# Patient Record
Sex: FEMALE | Race: WHITE | NOT HISPANIC OR LATINO | Employment: UNEMPLOYED | URBAN - NONMETROPOLITAN AREA
[De-identification: names, ages, dates, MRNs, and addresses within clinical notes are randomized per-mention and may not be internally consistent; named-entity substitution may affect disease eponyms.]

---

## 2017-07-03 ENCOUNTER — TRANSCRIBE ORDERS (OUTPATIENT)
Dept: ADMINISTRATIVE | Facility: HOSPITAL | Age: 42
End: 2017-07-03

## 2017-07-03 DIAGNOSIS — R92.8 ABNORMAL MAMMOGRAM: Primary | ICD-10-CM

## 2017-07-12 ENCOUNTER — APPOINTMENT (OUTPATIENT)
Dept: ULTRASOUND IMAGING | Facility: HOSPITAL | Age: 42
End: 2017-07-12
Attending: FAMILY MEDICINE

## 2017-07-12 ENCOUNTER — APPOINTMENT (OUTPATIENT)
Dept: MAMMOGRAPHY | Facility: HOSPITAL | Age: 42
End: 2017-07-12
Attending: FAMILY MEDICINE

## 2018-09-04 ENCOUNTER — HOSPITAL ENCOUNTER (OUTPATIENT)
Facility: HOSPITAL | Age: 43
Discharge: HOME | End: 2018-09-05
Attending: EMERGENCY MEDICINE | Admitting: PHYSICIAN ASSISTANT

## 2018-09-04 ENCOUNTER — ANESTHESIA (OUTPATIENT)
Dept: PERIOP | Facility: HOSPITAL | Age: 43
End: 2018-09-04

## 2018-09-04 ENCOUNTER — ANESTHESIA EVENT (OUTPATIENT)
Dept: PERIOP | Facility: HOSPITAL | Age: 43
End: 2018-09-04

## 2018-09-04 ENCOUNTER — PREP FOR SURGERY (OUTPATIENT)
Dept: OTHER | Facility: HOSPITAL | Age: 43
End: 2018-09-04

## 2018-09-04 DIAGNOSIS — I10 ESSENTIAL HYPERTENSION: ICD-10-CM

## 2018-09-04 DIAGNOSIS — N76.4 VULVAR ABSCESS: ICD-10-CM

## 2018-09-04 DIAGNOSIS — E11.9 TYPE 2 DIABETES MELLITUS WITHOUT COMPLICATION, WITHOUT LONG-TERM CURRENT USE OF INSULIN (HCC): ICD-10-CM

## 2018-09-04 DIAGNOSIS — Z72.0 TOBACCO ABUSE: ICD-10-CM

## 2018-09-04 DIAGNOSIS — N76.4 ABSCESS OF VULVA: ICD-10-CM

## 2018-09-04 DIAGNOSIS — N76.4 ABSCESS, VULVA: Primary | ICD-10-CM

## 2018-09-04 LAB
ALBUMIN SERPL-MCNC: 4.1 G/DL (ref 3.5–5)
ALBUMIN/GLOB SERPL: 1.4 G/DL (ref 1.5–2.5)
ALP SERPL-CCNC: 74 U/L (ref 35–104)
ALT SERPL W P-5'-P-CCNC: 26 U/L (ref 10–36)
ANION GAP SERPL CALCULATED.3IONS-SCNC: 3.5 MMOL/L (ref 3.6–11.2)
AST SERPL-CCNC: 16 U/L (ref 10–30)
B-HCG UR QL: NEGATIVE
BACTERIA UR QL AUTO: ABNORMAL /HPF
BASOPHILS # BLD AUTO: 0.04 10*3/MM3 (ref 0–0.3)
BASOPHILS NFR BLD AUTO: 0.3 % (ref 0–2)
BILIRUB SERPL-MCNC: 0.4 MG/DL (ref 0.2–1.8)
BILIRUB UR QL STRIP: NEGATIVE
BUN BLD-MCNC: 12 MG/DL (ref 7–21)
BUN/CREAT SERPL: 13.5 (ref 7–25)
CALCIUM SPEC-SCNC: 8.9 MG/DL (ref 7.7–10)
CHLORIDE SERPL-SCNC: 109 MMOL/L (ref 99–112)
CLARITY UR: ABNORMAL
CO2 SERPL-SCNC: 22.5 MMOL/L (ref 24.3–31.9)
COLOR UR: YELLOW
CREAT BLD-MCNC: 0.89 MG/DL (ref 0.43–1.29)
D-LACTATE SERPL-SCNC: 1.4 MMOL/L (ref 0.5–2)
DEPRECATED RDW RBC AUTO: 40.1 FL (ref 37–54)
EOSINOPHIL # BLD AUTO: 0.11 10*3/MM3 (ref 0–0.7)
EOSINOPHIL NFR BLD AUTO: 0.7 % (ref 0–5)
ERYTHROCYTE [DISTWIDTH] IN BLOOD BY AUTOMATED COUNT: 12.6 % (ref 11.5–14.5)
GFR SERPL CREATININE-BSD FRML MDRD: 69 ML/MIN/1.73
GLOBULIN UR ELPH-MCNC: 3 GM/DL
GLUCOSE BLD-MCNC: 169 MG/DL (ref 70–110)
GLUCOSE BLDC GLUCOMTR-MCNC: 111 MG/DL (ref 70–130)
GLUCOSE BLDC GLUCOMTR-MCNC: 114 MG/DL (ref 70–130)
GLUCOSE BLDC GLUCOMTR-MCNC: 154 MG/DL (ref 70–130)
GLUCOSE BLDC GLUCOMTR-MCNC: 74 MG/DL (ref 70–130)
GLUCOSE UR STRIP-MCNC: NEGATIVE MG/DL
HCT VFR BLD AUTO: 41.2 % (ref 37–47)
HGB BLD-MCNC: 13.5 G/DL (ref 12–16)
HGB UR QL STRIP.AUTO: NEGATIVE
HYALINE CASTS UR QL AUTO: ABNORMAL /LPF
IMM GRANULOCYTES # BLD: 0.04 10*3/MM3 (ref 0–0.03)
IMM GRANULOCYTES NFR BLD: 0.3 % (ref 0–0.5)
KETONES UR QL STRIP: NEGATIVE
LEUKOCYTE ESTERASE UR QL STRIP.AUTO: ABNORMAL
LYMPHOCYTES # BLD AUTO: 2.19 10*3/MM3 (ref 1–3)
LYMPHOCYTES NFR BLD AUTO: 13.9 % (ref 21–51)
MCH RBC QN AUTO: 29.1 PG (ref 27–33)
MCHC RBC AUTO-ENTMCNC: 32.8 G/DL (ref 33–37)
MCV RBC AUTO: 88.8 FL (ref 80–94)
MONOCYTES # BLD AUTO: 0.92 10*3/MM3 (ref 0.1–0.9)
MONOCYTES NFR BLD AUTO: 5.9 % (ref 0–10)
NEUTROPHILS # BLD AUTO: 12.42 10*3/MM3 (ref 1.4–6.5)
NEUTROPHILS NFR BLD AUTO: 78.9 % (ref 30–70)
NITRITE UR QL STRIP: NEGATIVE
OSMOLALITY SERPL CALC.SUM OF ELEC: 273.8 MOSM/KG (ref 273–305)
PH UR STRIP.AUTO: 6 [PH] (ref 5–8)
PLATELET # BLD AUTO: 273 10*3/MM3 (ref 130–400)
PMV BLD AUTO: 11.3 FL (ref 6–10)
POTASSIUM BLD-SCNC: 3.9 MMOL/L (ref 3.5–5.3)
PROT SERPL-MCNC: 7.1 G/DL (ref 6–8)
PROT UR QL STRIP: NEGATIVE
RBC # BLD AUTO: 4.64 10*6/MM3 (ref 4.2–5.4)
RBC # UR: ABNORMAL /HPF
REF LAB TEST METHOD: ABNORMAL
SODIUM BLD-SCNC: 135 MMOL/L (ref 135–153)
SP GR UR STRIP: 1.01 (ref 1–1.03)
SQUAMOUS #/AREA URNS HPF: ABNORMAL /HPF
UROBILINOGEN UR QL STRIP: ABNORMAL
WBC NRBC COR # BLD: 15.72 10*3/MM3 (ref 4.5–12.5)
WBC UR QL AUTO: ABNORMAL /HPF

## 2018-09-04 PROCEDURE — 25010000002 MORPHINE SULFATE (PF) 2 MG/ML SOLUTION: Performed by: EMERGENCY MEDICINE

## 2018-09-04 PROCEDURE — 25010000002 FENTANYL CITRATE (PF) 100 MCG/2ML SOLUTION: Performed by: NURSE ANESTHETIST, CERTIFIED REGISTERED

## 2018-09-04 PROCEDURE — 25010000002 PROPOFOL 1000 MG/ML EMULSION: Performed by: NURSE ANESTHETIST, CERTIFIED REGISTERED

## 2018-09-04 PROCEDURE — 25010000002 HYDROMORPHONE PER 4 MG: Performed by: OBSTETRICS & GYNECOLOGY

## 2018-09-04 PROCEDURE — 25010000002 PROPOFOL 10 MG/ML EMULSION: Performed by: NURSE ANESTHETIST, CERTIFIED REGISTERED

## 2018-09-04 PROCEDURE — G0378 HOSPITAL OBSERVATION PER HR: HCPCS

## 2018-09-04 PROCEDURE — 83605 ASSAY OF LACTIC ACID: CPT | Performed by: PHYSICIAN ASSISTANT

## 2018-09-04 PROCEDURE — 25010000002 PIPERACILLIN-TAZOBACTAM: Performed by: PHYSICIAN ASSISTANT

## 2018-09-04 PROCEDURE — 25010000002 ONDANSETRON PER 1 MG: Performed by: EMERGENCY MEDICINE

## 2018-09-04 PROCEDURE — 96361 HYDRATE IV INFUSION ADD-ON: CPT

## 2018-09-04 PROCEDURE — 87040 BLOOD CULTURE FOR BACTERIA: CPT | Performed by: PHYSICIAN ASSISTANT

## 2018-09-04 PROCEDURE — 96365 THER/PROPH/DIAG IV INF INIT: CPT

## 2018-09-04 PROCEDURE — 25010000002 VANCOMYCIN PER 500 MG: Performed by: PHYSICIAN ASSISTANT

## 2018-09-04 PROCEDURE — 94640 AIRWAY INHALATION TREATMENT: CPT

## 2018-09-04 PROCEDURE — 96375 TX/PRO/DX INJ NEW DRUG ADDON: CPT

## 2018-09-04 PROCEDURE — 85025 COMPLETE CBC W/AUTO DIFF WBC: CPT | Performed by: PHYSICIAN ASSISTANT

## 2018-09-04 PROCEDURE — 99285 EMERGENCY DEPT VISIT HI MDM: CPT

## 2018-09-04 PROCEDURE — 25010000002 MIDAZOLAM PER 1 MG: Performed by: NURSE ANESTHETIST, CERTIFIED REGISTERED

## 2018-09-04 PROCEDURE — 81025 URINE PREGNANCY TEST: CPT | Performed by: PHYSICIAN ASSISTANT

## 2018-09-04 PROCEDURE — 94799 UNLISTED PULMONARY SVC/PX: CPT

## 2018-09-04 PROCEDURE — 94799 UNLISTED PULMONARY SVC/PX: CPT | Performed by: NURSE PRACTITIONER

## 2018-09-04 PROCEDURE — 82962 GLUCOSE BLOOD TEST: CPT

## 2018-09-04 PROCEDURE — 87070 CULTURE OTHR SPECIMN AEROBIC: CPT | Performed by: OBSTETRICS & GYNECOLOGY

## 2018-09-04 PROCEDURE — 80053 COMPREHEN METABOLIC PANEL: CPT | Performed by: PHYSICIAN ASSISTANT

## 2018-09-04 PROCEDURE — 87205 SMEAR GRAM STAIN: CPT | Performed by: OBSTETRICS & GYNECOLOGY

## 2018-09-04 PROCEDURE — 81001 URINALYSIS AUTO W/SCOPE: CPT | Performed by: PHYSICIAN ASSISTANT

## 2018-09-04 RX ORDER — SODIUM CHLORIDE 0.9 % (FLUSH) 0.9 %
1-10 SYRINGE (ML) INJECTION AS NEEDED
Status: DISCONTINUED | OUTPATIENT
Start: 2018-09-04 | End: 2018-09-04 | Stop reason: HOSPADM

## 2018-09-04 RX ORDER — PANTOPRAZOLE SODIUM 40 MG/1
40 TABLET, DELAYED RELEASE ORAL EVERY MORNING
Status: DISCONTINUED | OUTPATIENT
Start: 2018-09-05 | End: 2018-09-05 | Stop reason: HOSPADM

## 2018-09-04 RX ORDER — CETIRIZINE HYDROCHLORIDE 10 MG/1
10 TABLET ORAL DAILY
Status: CANCELLED | OUTPATIENT
Start: 2018-09-04

## 2018-09-04 RX ORDER — CETIRIZINE HYDROCHLORIDE 10 MG/1
10 TABLET ORAL DAILY
Status: DISCONTINUED | OUTPATIENT
Start: 2018-09-04 | End: 2018-09-05 | Stop reason: HOSPADM

## 2018-09-04 RX ORDER — PROPOFOL 10 MG/ML
VIAL (ML) INTRAVENOUS AS NEEDED
Status: DISCONTINUED | OUTPATIENT
Start: 2018-09-04 | End: 2018-09-04 | Stop reason: SURG

## 2018-09-04 RX ORDER — AMLODIPINE BESYLATE 5 MG/1
5 TABLET ORAL NIGHTLY
Status: DISCONTINUED | OUTPATIENT
Start: 2018-09-04 | End: 2018-09-05 | Stop reason: HOSPADM

## 2018-09-04 RX ORDER — HYDROCHLOROTHIAZIDE 25 MG/1
25 TABLET ORAL DAILY
Status: DISCONTINUED | OUTPATIENT
Start: 2018-09-04 | End: 2018-09-05 | Stop reason: HOSPADM

## 2018-09-04 RX ORDER — DEXTROSE MONOHYDRATE 25 G/50ML
25 INJECTION, SOLUTION INTRAVENOUS
Status: DISCONTINUED | OUTPATIENT
Start: 2018-09-04 | End: 2018-09-05 | Stop reason: HOSPADM

## 2018-09-04 RX ORDER — NICOTINE POLACRILEX 4 MG
15 LOZENGE BUCCAL
Status: DISCONTINUED | OUTPATIENT
Start: 2018-09-04 | End: 2018-09-05 | Stop reason: HOSPADM

## 2018-09-04 RX ORDER — DOCUSATE SODIUM 100 MG/1
100 CAPSULE, LIQUID FILLED ORAL 2 TIMES DAILY
Status: DISCONTINUED | OUTPATIENT
Start: 2018-09-04 | End: 2018-09-05 | Stop reason: HOSPADM

## 2018-09-04 RX ORDER — IPRATROPIUM BROMIDE AND ALBUTEROL SULFATE 2.5; .5 MG/3ML; MG/3ML
3 SOLUTION RESPIRATORY (INHALATION) ONCE AS NEEDED
Status: COMPLETED | OUTPATIENT
Start: 2018-09-04 | End: 2018-09-04

## 2018-09-04 RX ORDER — LOSARTAN POTASSIUM 50 MG/1
100 TABLET ORAL DAILY
Status: DISCONTINUED | OUTPATIENT
Start: 2018-09-04 | End: 2018-09-05 | Stop reason: HOSPADM

## 2018-09-04 RX ORDER — GLIPIZIDE 5 MG/1
5 TABLET ORAL
Status: CANCELLED | OUTPATIENT
Start: 2018-09-05

## 2018-09-04 RX ORDER — PANTOPRAZOLE SODIUM 40 MG/1
40 TABLET, DELAYED RELEASE ORAL EVERY MORNING
Status: CANCELLED | OUTPATIENT
Start: 2018-09-04

## 2018-09-04 RX ORDER — SODIUM CHLORIDE 0.9 % (FLUSH) 0.9 %
10 SYRINGE (ML) INJECTION AS NEEDED
Status: DISCONTINUED | OUTPATIENT
Start: 2018-09-04 | End: 2018-09-04

## 2018-09-04 RX ORDER — MONTELUKAST SODIUM 10 MG/1
10 TABLET ORAL NIGHTLY
COMMUNITY

## 2018-09-04 RX ORDER — ATORVASTATIN CALCIUM 40 MG/1
40 TABLET, FILM COATED ORAL DAILY
COMMUNITY

## 2018-09-04 RX ORDER — ASPIRIN 81 MG/1
81 TABLET ORAL DAILY
Status: DISCONTINUED | OUTPATIENT
Start: 2018-09-04 | End: 2018-09-05 | Stop reason: HOSPADM

## 2018-09-04 RX ORDER — AMLODIPINE BESYLATE 5 MG/1
5 TABLET ORAL NIGHTLY
Status: CANCELLED | OUTPATIENT
Start: 2018-09-04

## 2018-09-04 RX ORDER — HYDROCODONE BITARTRATE AND ACETAMINOPHEN 5; 325 MG/1; MG/1
1 TABLET ORAL EVERY 4 HOURS PRN
Status: DISCONTINUED | OUTPATIENT
Start: 2018-09-04 | End: 2018-09-05 | Stop reason: HOSPADM

## 2018-09-04 RX ORDER — ATORVASTATIN CALCIUM 40 MG/1
40 TABLET, FILM COATED ORAL DAILY
Status: CANCELLED | OUTPATIENT
Start: 2018-09-04

## 2018-09-04 RX ORDER — SENNA AND DOCUSATE SODIUM 50; 8.6 MG/1; MG/1
2 TABLET, FILM COATED ORAL NIGHTLY
Status: DISCONTINUED | OUTPATIENT
Start: 2018-09-04 | End: 2018-09-05 | Stop reason: HOSPADM

## 2018-09-04 RX ORDER — MONTELUKAST SODIUM 10 MG/1
10 TABLET ORAL NIGHTLY
Status: CANCELLED | OUTPATIENT
Start: 2018-09-04

## 2018-09-04 RX ORDER — ASPIRIN 81 MG/1
81 TABLET ORAL DAILY
Status: CANCELLED | OUTPATIENT
Start: 2018-09-04

## 2018-09-04 RX ORDER — GLIPIZIDE 5 MG/1
5 TABLET ORAL DAILY
Status: DISCONTINUED | OUTPATIENT
Start: 2018-09-04 | End: 2018-09-05 | Stop reason: HOSPADM

## 2018-09-04 RX ORDER — MAGNESIUM HYDROXIDE 1200 MG/15ML
LIQUID ORAL AS NEEDED
Status: DISCONTINUED | OUTPATIENT
Start: 2018-09-04 | End: 2018-09-04 | Stop reason: HOSPADM

## 2018-09-04 RX ORDER — FENTANYL CITRATE 50 UG/ML
INJECTION, SOLUTION INTRAMUSCULAR; INTRAVENOUS AS NEEDED
Status: DISCONTINUED | OUTPATIENT
Start: 2018-09-04 | End: 2018-09-04 | Stop reason: SURG

## 2018-09-04 RX ORDER — CALCIUM CARBONATE 200(500)MG
2 TABLET,CHEWABLE ORAL 2 TIMES DAILY PRN
Status: DISCONTINUED | OUTPATIENT
Start: 2018-09-04 | End: 2018-09-05 | Stop reason: HOSPADM

## 2018-09-04 RX ORDER — HYDROMORPHONE HYDROCHLORIDE 1 MG/ML
0.5 INJECTION, SOLUTION INTRAMUSCULAR; INTRAVENOUS; SUBCUTANEOUS
Status: DISCONTINUED | OUTPATIENT
Start: 2018-09-04 | End: 2018-09-05 | Stop reason: HOSPADM

## 2018-09-04 RX ORDER — SODIUM CHLORIDE 0.9 % (FLUSH) 0.9 %
1-10 SYRINGE (ML) INJECTION AS NEEDED
Status: DISCONTINUED | OUTPATIENT
Start: 2018-09-04 | End: 2018-09-05 | Stop reason: HOSPADM

## 2018-09-04 RX ORDER — SULFAMETHOXAZOLE AND TRIMETHOPRIM 800; 160 MG/1; MG/1
1 TABLET ORAL EVERY 12 HOURS
Status: DISCONTINUED | OUTPATIENT
Start: 2018-09-04 | End: 2018-09-05 | Stop reason: HOSPADM

## 2018-09-04 RX ORDER — MEPERIDINE HYDROCHLORIDE 50 MG/ML
12.5 INJECTION INTRAMUSCULAR; INTRAVENOUS; SUBCUTANEOUS
Status: DISCONTINUED | OUTPATIENT
Start: 2018-09-04 | End: 2018-09-04 | Stop reason: HOSPADM

## 2018-09-04 RX ORDER — SODIUM CHLORIDE, SODIUM LACTATE, POTASSIUM CHLORIDE, CALCIUM CHLORIDE 600; 310; 30; 20 MG/100ML; MG/100ML; MG/100ML; MG/100ML
125 INJECTION, SOLUTION INTRAVENOUS CONTINUOUS
Status: DISCONTINUED | OUTPATIENT
Start: 2018-09-04 | End: 2018-09-05 | Stop reason: HOSPADM

## 2018-09-04 RX ORDER — KETOROLAC TROMETHAMINE 30 MG/ML
30 INJECTION, SOLUTION INTRAMUSCULAR; INTRAVENOUS EVERY 6 HOURS PRN
Status: DISCONTINUED | OUTPATIENT
Start: 2018-09-04 | End: 2018-09-05 | Stop reason: HOSPADM

## 2018-09-04 RX ORDER — GLIPIZIDE 5 MG/1
5 TABLET ORAL DAILY
Status: CANCELLED | OUTPATIENT
Start: 2018-09-04

## 2018-09-04 RX ORDER — ATORVASTATIN CALCIUM 40 MG/1
40 TABLET, FILM COATED ORAL DAILY
Status: DISCONTINUED | OUTPATIENT
Start: 2018-09-04 | End: 2018-09-05 | Stop reason: HOSPADM

## 2018-09-04 RX ORDER — LIDOCAINE HYDROCHLORIDE 20 MG/ML
INJECTION, SOLUTION INFILTRATION; PERINEURAL AS NEEDED
Status: DISCONTINUED | OUTPATIENT
Start: 2018-09-04 | End: 2018-09-04 | Stop reason: SURG

## 2018-09-04 RX ORDER — NICOTINE 21 MG/24HR
1 PATCH, TRANSDERMAL 24 HOURS TRANSDERMAL DAILY
Status: DISCONTINUED | OUTPATIENT
Start: 2018-09-04 | End: 2018-09-05 | Stop reason: HOSPADM

## 2018-09-04 RX ORDER — ONDANSETRON 2 MG/ML
4 INJECTION INTRAMUSCULAR; INTRAVENOUS EVERY 6 HOURS PRN
Status: DISCONTINUED | OUTPATIENT
Start: 2018-09-04 | End: 2018-09-05 | Stop reason: HOSPADM

## 2018-09-04 RX ORDER — LORATADINE 10 MG/1
10 TABLET ORAL DAILY
COMMUNITY

## 2018-09-04 RX ORDER — MONTELUKAST SODIUM 10 MG/1
10 TABLET ORAL NIGHTLY
Status: DISCONTINUED | OUTPATIENT
Start: 2018-09-04 | End: 2018-09-05 | Stop reason: HOSPADM

## 2018-09-04 RX ORDER — ONDANSETRON 2 MG/ML
4 INJECTION INTRAMUSCULAR; INTRAVENOUS ONCE
Status: COMPLETED | OUTPATIENT
Start: 2018-09-04 | End: 2018-09-04

## 2018-09-04 RX ORDER — MORPHINE SULFATE 2 MG/ML
4 INJECTION, SOLUTION INTRAMUSCULAR; INTRAVENOUS ONCE
Status: COMPLETED | OUTPATIENT
Start: 2018-09-04 | End: 2018-09-04

## 2018-09-04 RX ORDER — MIDAZOLAM HYDROCHLORIDE 1 MG/ML
INJECTION INTRAMUSCULAR; INTRAVENOUS AS NEEDED
Status: DISCONTINUED | OUTPATIENT
Start: 2018-09-04 | End: 2018-09-04 | Stop reason: SURG

## 2018-09-04 RX ORDER — FENTANYL CITRATE 50 UG/ML
50 INJECTION, SOLUTION INTRAMUSCULAR; INTRAVENOUS
Status: DISCONTINUED | OUTPATIENT
Start: 2018-09-04 | End: 2018-09-04 | Stop reason: HOSPADM

## 2018-09-04 RX ORDER — OXYCODONE HYDROCHLORIDE AND ACETAMINOPHEN 5; 325 MG/1; MG/1
1 TABLET ORAL ONCE AS NEEDED
Status: DISCONTINUED | OUTPATIENT
Start: 2018-09-04 | End: 2018-09-04 | Stop reason: HOSPADM

## 2018-09-04 RX ORDER — ONDANSETRON 2 MG/ML
4 INJECTION INTRAMUSCULAR; INTRAVENOUS ONCE AS NEEDED
Status: DISCONTINUED | OUTPATIENT
Start: 2018-09-04 | End: 2018-09-04 | Stop reason: HOSPADM

## 2018-09-04 RX ADMIN — ASPIRIN 81 MG: 81 TABLET ORAL at 16:27

## 2018-09-04 RX ADMIN — CETIRIZINE HYDROCHLORIDE 10 MG: 10 TABLET, FILM COATED ORAL at 16:27

## 2018-09-04 RX ADMIN — VANCOMYCIN HYDROCHLORIDE 2000 MG: 5 INJECTION, POWDER, LYOPHILIZED, FOR SOLUTION INTRAVENOUS at 09:50

## 2018-09-04 RX ADMIN — NICOTINE 1 PATCH: 21 PATCH TRANSDERMAL at 15:09

## 2018-09-04 RX ADMIN — HYDROCODONE BITARTRATE AND ACETAMINOPHEN 1 TABLET: 5; 325 TABLET ORAL at 18:53

## 2018-09-04 RX ADMIN — SENNOSIDES AND DOCUSATE SODIUM 2 TABLET: 8.6; 5 TABLET ORAL at 23:25

## 2018-09-04 RX ADMIN — MORPHINE SULFATE 4 MG: 2 INJECTION, SOLUTION INTRAMUSCULAR; INTRAVENOUS at 08:53

## 2018-09-04 RX ADMIN — PROPOFOL 140 MCG/KG/MIN: 10 INJECTION, EMULSION INTRAVENOUS at 13:54

## 2018-09-04 RX ADMIN — MONTELUKAST SODIUM 10 MG: 10 TABLET, COATED ORAL at 23:25

## 2018-09-04 RX ADMIN — HYDROMORPHONE HYDROCHLORIDE 0.5 MG: 1 INJECTION, SOLUTION INTRAMUSCULAR; INTRAVENOUS; SUBCUTANEOUS at 11:57

## 2018-09-04 RX ADMIN — ONDANSETRON 4 MG: 2 INJECTION INTRAMUSCULAR; INTRAVENOUS at 08:50

## 2018-09-04 RX ADMIN — DOCUSATE SODIUM 100 MG: 100 CAPSULE, LIQUID FILLED ORAL at 23:25

## 2018-09-04 RX ADMIN — FENTANYL CITRATE 50 MCG: 50 INJECTION INTRAMUSCULAR; INTRAVENOUS at 14:06

## 2018-09-04 RX ADMIN — LOSARTAN POTASSIUM 100 MG: 50 TABLET, FILM COATED ORAL at 16:27

## 2018-09-04 RX ADMIN — HYDROCHLOROTHIAZIDE 25 MG: 25 TABLET ORAL at 16:48

## 2018-09-04 RX ADMIN — FENTANYL CITRATE 50 MCG: 50 INJECTION INTRAMUSCULAR; INTRAVENOUS at 14:09

## 2018-09-04 RX ADMIN — LIDOCAINE HYDROCHLORIDE 40 MG: 20 INJECTION, SOLUTION INFILTRATION; PERINEURAL at 13:54

## 2018-09-04 RX ADMIN — PIPERACILLIN SODIUM,TAZOBACTAM SODIUM 3.38 G: 3; .375 INJECTION, POWDER, FOR SOLUTION INTRAVENOUS at 09:06

## 2018-09-04 RX ADMIN — SODIUM CHLORIDE 1000 ML: 9 INJECTION, SOLUTION INTRAVENOUS at 08:48

## 2018-09-04 RX ADMIN — SULFAMETHOXAZOLE AND TRIMETHOPRIM 160 MG: 800; 160 TABLET ORAL at 18:53

## 2018-09-04 RX ADMIN — PROPOFOL 50 MG: 10 INJECTION, EMULSION INTRAVENOUS at 13:54

## 2018-09-04 RX ADMIN — AMLODIPINE BESYLATE 5 MG: 5 TABLET ORAL at 23:25

## 2018-09-04 RX ADMIN — IPRATROPIUM BROMIDE AND ALBUTEROL SULFATE 3 ML: .5; 3 SOLUTION RESPIRATORY (INHALATION) at 14:27

## 2018-09-04 RX ADMIN — ATORVASTATIN CALCIUM 40 MG: 40 TABLET, FILM COATED ORAL at 16:27

## 2018-09-04 RX ADMIN — MIDAZOLAM HYDROCHLORIDE 2 MG: 1 INJECTION, SOLUTION INTRAMUSCULAR; INTRAVENOUS at 13:49

## 2018-09-04 RX ADMIN — SODIUM CHLORIDE, POTASSIUM CHLORIDE, SODIUM LACTATE AND CALCIUM CHLORIDE 125 ML/HR: 600; 310; 30; 20 INJECTION, SOLUTION INTRAVENOUS at 13:44

## 2018-09-04 RX ADMIN — FENTANYL CITRATE 100 MCG: 50 INJECTION INTRAMUSCULAR; INTRAVENOUS at 13:49

## 2018-09-04 RX ADMIN — GLIPIZIDE 5 MG: 5 TABLET ORAL at 16:27

## 2018-09-04 NOTE — ED NOTES
Provider speaking to pt, pt to be admitted, pt verb. understanding     Jasmin Zaldivar, RN  09/04/18 0919

## 2018-09-04 NOTE — OP NOTE
PERINEAL ABSCESS INCISION AND DRAINAGE  Procedure Note    Shahrzad Horton  9/4/2018    Pre-op Diagnosis:   Abscess, vulva [N76.4]    Post-op Diagnosis:     Post-Op Diagnosis Codes:     * Abscess, vulva [N76.4]    Procedure(s):  INCISION AND DRAINAGE OF RIGHT VULVA ABSCESS     Surgeon(s):  Shannan Farfan DO Gilbert, Travis Daniel, DO    Anesthesia: MAC    Staff:   Circulator: Meg Olmedo RN  Scrub Person: Rachel Resendez LPN; Michelle Sarah    Estimated Blood Loss: minimal    Specimens:                  Order Name Source Comment Collection Info Order Time   CULTURE, ROUTINE Vulva  Collected By: Shannan Farfan DO 9/4/2018  2:06 PM         Procedure     The patient was prepped and draped in normal sterile fashion in the dorsal lithotomy position with careful placement of the lower extremity in Yellow fin stirrups. The abscess was palpated in the upper right labia majora.  A 1 cm stab incision was made with an 11 blade scalpel into the cyst wall.  Pus was expressed and cultured.  The cyst cavity was explored with mosquito hemostats to break up any loculations and irrigated with normal saline. It was then packed with 1/4 inch wet gauze.  All sponge, lap and needle counts were correct.  The patient was taken to the recovery room in stable condition.      Shannan Farfan DO     Date: 9/4/2018  Time: 2:24 PM

## 2018-09-04 NOTE — H&P
Chief complaint   Chief Complaint   Patient presents with   • Abscess       History of Present Illness: Patient is a 43 y.o. female who presents for pain and swelling on Vulva. She states it started 3 days ago on Saturday and has progressively gotten worse.  She states she has had this before and she has a history of MRSA      Past Medical History:   Diagnosis Date   • Abnormal Pap smear of cervix    • Abnormal vaginal bleeding    • Arthritis    • Asthma    • Cervical cancer (CMS/HCC) 2000    Had cryo surgery and part of cervix removed   • Diabetes mellitus (CMS/[facility])     Takes oral medication   • Hyperlipidemia    • Hypertension    • Migraine    • Rh incompatibility    • Substance abuse     [place]ma[place] Giulianodarrin   • TIA (transient ischemic attack) 2012   • Urogenital trichomoniasis 2010    Was tx'd       Past Surgical History:   Procedure Laterality Date   • CERVICAL BIOPSY     • CHOLECYSTECTOMY     • DILATATION AND CURETTAGE     • ENDOMETRIAL ABLATION     • GYNECOLOGIC CRYOSURGERY     • HYSTEROSCOPY N/A 11/16/2016    Procedure: HYSTEROSCOPY, D&C,  ENDOMETRIAL ABLATION;  Surgeon: Vikram Clark DO;  Location: [facility];  Service:    • TUBAL ABDOMINAL LIGATION     • TUBAL FILSHIE CLIPPING LAPAROSCOPIC WITH ENDOMETRIAL ABLATION N/A 11/16/2016    Procedure: TUBAL FILSHIE CLIPPING LAPAROSCOPIC;  Surgeon: Vikram Clark DO;  Location: [facility];  Service:        Family History   Problem Relation Age of Onset   • Breast cancer Maternal Grandmother    • Breast cancer Maternal Aunt        Social History   Substance Use Topics   • Smoking status: Current Every Day Smoker     Packs/day: 0.50     Years: 35.00     Types: Cigarettes     Start date: 9/4/1985   • Smokeless tobacco: Never Used   • Alcohol use 1.2 oz/week     2 Cans of beer per week      Comment: ocas       Prescriptions Prior to Admission   Medication Sig Dispense Refill Last Dose   • amitriptyline (ELAVIL) 25 MG tablet Take 25 mg by mouth Every Night.    11/15/2016 at 2300   • amLODIPine (NORVASC) 5 MG tablet Take 5 mg by mouth Every Night.   11/15/2016 at 2300   • aspirin 81 MG EC tablet Take 81 mg by mouth Daily.   11/15/2016 at 0800   • atorvastatin (LIPITOR) 20 MG tablet Take 20 mg by mouth Daily.   11/15/2016 at 2300   • cetirizine (zyrTEC) 10 MG tablet Take 10 mg by mouth Daily.   11/15/2016 at 0800   • glipiZIDE (GLUCOTROL) 5 MG tablet Take 5 mg by mouth 2 (Two) Times a Day Before Meals.   11/15/2016 at 1600   • losartan-hydrochlorothiazide (HYZAAR) 100-25 MG per tablet Take 1 tablet by mouth Daily.   11/16/2016 at 0900   • omeprazole (priLOSEC) 20 MG capsule Take 20 mg by mouth Daily.   11/15/2016 at 0800       Allergies:  Patient has no known allergies.      Vital Signs  Temp:  [98 °F (36.7 °C)-98.6 °F (37 °C)] 98.2 °F (36.8 °C)  Heart Rate:  [] 84  Resp:  [18] 18  BP: (111-183)/(70-99) 142/70      Review of Systems    The following systems were reviewed and negative;  ENT, respiratory, chest pain, gastrointestinal.  She admits to vulvar pain and swelling w/o discharge.  She admits to DM and HTN.      Physical Exam:      General Appearance:    Alert, cooperative, in no acute distress   Head:    Normocephalic, without obvious abnormality, atraumatic   Eyes:            Lids and lashes normal, conjunctivae and sclerae normal, no   icterus, no pallor, corneas clear, PERRLA               Back:     range of motion normal   Lungs:     Clear to auscultation,respirations regular, even and                   unlabored    Heart:    Regular rhythm and normal rate, normal S1 and S2, no            murmur, no gallop, no rub, no click   Breast Exam:    Deferred   Abdomen:     Normal bowel sounds, no masses, no organomegaly, soft        non-tender, non-distended, no guarding, no rebound                 tenderness   Genitalia:   4 x 4 cm abscess Right vulva with erythema.  Tender to touch.    Extremities:   Moves all extremities well, no edema, no cyanosis, no               redness   Pulses:   Pulses palpable and equal bilaterally   Skin:   No bleeding, bruising or rash                 Assessment: Patient is a 43 y.o. female who presents with a Right vulvar abscess.  I have recommended I & D under MAC and wound care thereafter.  Risk of anesthesia, infection, bleeding and scar formation are reviewed.  Pt admits to understanding all of the above and desires to proceed.     Plan of Care: Proceed with Incision and Drainage of Right Vulvar abscess.       Shannan Farfan DO  09/04/18  12:12 PM

## 2018-09-04 NOTE — ED NOTES
Pt alert, oriented, stable, nad noted, iv vancomycin infusing via pump without any difficulty or abnormality  Noted to site, no complaints voiced from pt     Jasmin Zaldivar, RN  09/04/18 6316

## 2018-09-04 NOTE — ED NOTES
Patient sent to restroom to attempt to obtain urine sample. Given instructions regarding clean catch sample, verbalizes understanding. Awaiting further orders, will continue to monitor and follow plan of care.      Nhi Henley RN  09/04/18 0851

## 2018-09-04 NOTE — PLAN OF CARE
Problem: Patient Care Overview  Goal: Individualization and Mutuality  Outcome: Ongoing (interventions implemented as appropriate)    Goal: Discharge Needs Assessment  Outcome: Ongoing (interventions implemented as appropriate)    Goal: Interprofessional Rounds/Family Conf  Outcome: Ongoing (interventions implemented as appropriate)      Problem: Skin and Soft Tissue Infection (Adult)  Goal: Signs and Symptoms of Listed Potential Problems Will be Absent, Minimized or Managed (Skin and Soft Tissue Infection)  Outcome: Ongoing (interventions implemented as appropriate)

## 2018-09-04 NOTE — PHARMACY RECOMMENDATION
Pharmacy Kinetic Note  Vancomycin initiated for the treatment of a vulva abscess. Based on patient and population kinetic dosing parameters, will dose vancomycin at 2gm loading dose followed by 1.75gm IV q12h to target a trough 15-20mg/L. Will order trough level when steady state is achieved.  Thank You,  Gela BragaD

## 2018-09-04 NOTE — PROGRESS NOTES
Pharmacy consulted for tobacco cessation education. Spoke with patient, who indicated she is not interested in quitting smoking at this time.  Vania Magana RP

## 2018-09-04 NOTE — ANESTHESIA PREPROCEDURE EVALUATION
Anesthesia Evaluation     NPO Solid Status: > 8 hours  NPO Liquid Status: > 8 hours           Airway   Mallampati: III  TM distance: >3 FB  Possible difficult intubation  Dental - normal exam     Pulmonary - normal exam   Cardiovascular - normal exam  Exercise tolerance: good (4-7 METS)    NYHA Classification: II        Neuro/Psych  GI/Hepatic/Renal/Endo      Musculoskeletal     Abdominal  - normal exam   Substance History      OB/GYN          Other                        Anesthesia Plan    ASA 3     general     intravenous and inhalational induction   Anesthetic plan, all risks, benefits, and alternatives have been provided, discussed and informed consent has been obtained with: patient.  Use of blood products discussed with patient  Consented to blood products.   Plan discussed with CRNA.

## 2018-09-04 NOTE — ED PROVIDER NOTES
Subjective     Abscess   Location:  Pelvis  Pelvic abscess location:  Vulva  Size:  Quarter size  Abscess quality: painful    Abscess quality: not draining    Red streaking: no    Duration:  3 days  Progression:  Worsening  Pain details:     Severity:  Moderate    Timing:  Constant  Chronicity:  New  Context: diabetes    Associated symptoms: fever (subjective)        Review of Systems   Constitutional: Positive for fever (subjective).   HENT: Negative.    Respiratory: Negative.    Cardiovascular: Negative.  Negative for chest pain.   Gastrointestinal: Negative.  Negative for abdominal pain.   Endocrine: Negative.    Genitourinary: Negative.  Negative for dysuria.   Skin: Negative.    Neurological: Negative.    Psychiatric/Behavioral: Negative.    All other systems reviewed and are negative.      Past Medical History:   Diagnosis Date   • Abnormal vaginal bleeding    • Arthritis    • Asthma    • Diabetes mellitus (CMS/HCC)    • Hypertension        No Known Allergies    Past Surgical History:   Procedure Laterality Date   • CERVICAL BIOPSY     • CHOLECYSTECTOMY     • HYSTEROSCOPY N/A 11/16/2016    Procedure: HYSTEROSCOPY, D&C,  ENDOMETRIAL ABLATION;  Surgeon: Vikram Clark DO;  Location: [facility];  Service:    • TUBAL FILSHIE CLIPPING LAPAROSCOPIC WITH ENDOMETRIAL ABLATION N/A 11/16/2016    Procedure: TUBAL FILSHIE CLIPPING LAPAROSCOPIC;  Surgeon: Vikram Clark DO;  Location: [facility];  Service:        Family History   Problem Relation Age of Onset   • Breast cancer Maternal Grandmother    • Breast cancer Maternal Aunt        Social History     Social History   • Marital status:      Social History Main Topics   • Smoking status: Current Every Day Smoker     Packs/day: 0.50     Years: 35.00     Types: Electronic Cigarette   • Alcohol use Yes      Comment: ocas   • Drug use: Yes     Frequency: 3.0 times per week     Types: Marijuana      Comment: 11/14/16   • Sexual activity: Defer     Other  Topics Concern   • Not on file           Objective   Physical Exam   Constitutional: She is oriented to person, place, and time. She appears well-developed and well-nourished. No distress.   HENT:   Head: Normocephalic and atraumatic.   Right Ear: External ear normal.   Left Ear: External ear normal.   Nose: Nose normal.   Eyes: Pupils are equal, round, and reactive to light. Conjunctivae and EOM are normal.   Neck: Normal range of motion. Neck supple. No JVD present. No tracheal deviation present.   Cardiovascular: Normal rate, regular rhythm and normal heart sounds.    No murmur heard.  Pulmonary/Chest: Effort normal and breath sounds normal. No respiratory distress. She has no wheezes.   Abdominal: Soft. Bowel sounds are normal. There is no tenderness.   Musculoskeletal: Normal range of motion. She exhibits no edema or deformity.   Neurological: She is alert and oriented to person, place, and time. No cranial nerve deficit.   Skin: Skin is warm and dry. No rash noted. She is not diaphoretic. No erythema. No pallor.   Quarter size abscess right kassie   Psychiatric: She has a normal mood and affect. Her behavior is normal. Thought content normal.   Nursing note and vitals reviewed.      Procedures           ED Course                  MDM  Number of Diagnoses or Management Options  Abscess, vulva: new and requires workup     Amount and/or Complexity of Data Reviewed  Clinical lab tests: ordered and reviewed  Discuss the patient with other providers: yes    Risk of Complications, Morbidity, and/or Mortality  Presenting problems: moderate          Final diagnoses:   Abscess, vulva            Hugh Kern PA  09/04/18 0949

## 2018-09-05 VITALS
TEMPERATURE: 98.3 F | HEART RATE: 78 BPM | RESPIRATION RATE: 18 BRPM | SYSTOLIC BLOOD PRESSURE: 102 MMHG | HEIGHT: 69 IN | BODY MASS INDEX: 41.47 KG/M2 | OXYGEN SATURATION: 97 % | WEIGHT: 280 LBS | DIASTOLIC BLOOD PRESSURE: 54 MMHG

## 2018-09-05 PROBLEM — N76.4 ABSCESS OF VULVA: Status: RESOLVED | Noted: 2018-09-04 | Resolved: 2018-09-05

## 2018-09-05 PROBLEM — T81.49XA POSTOPERATIVE ABSCESS: Status: ACTIVE | Noted: 2018-09-05

## 2018-09-05 PROBLEM — N76.4 ABSCESS, VULVA: Status: RESOLVED | Noted: 2018-09-04 | Resolved: 2018-09-05

## 2018-09-05 LAB
DEPRECATED RDW RBC AUTO: 40.8 FL (ref 37–54)
ERYTHROCYTE [DISTWIDTH] IN BLOOD BY AUTOMATED COUNT: 12.6 % (ref 11.5–14.5)
GLUCOSE BLDC GLUCOMTR-MCNC: 107 MG/DL (ref 70–130)
GLUCOSE BLDC GLUCOMTR-MCNC: 70 MG/DL (ref 70–130)
HCT VFR BLD AUTO: 37.4 % (ref 37–47)
HGB BLD-MCNC: 12.2 G/DL (ref 12–16)
MCH RBC QN AUTO: 29.1 PG (ref 27–33)
MCHC RBC AUTO-ENTMCNC: 32.6 G/DL (ref 33–37)
MCV RBC AUTO: 89.3 FL (ref 80–94)
PLATELET # BLD AUTO: 236 10*3/MM3 (ref 130–400)
PMV BLD AUTO: 11.4 FL (ref 6–10)
RBC # BLD AUTO: 4.19 10*6/MM3 (ref 4.2–5.4)
WBC NRBC COR # BLD: 11.83 10*3/MM3 (ref 4.5–12.5)

## 2018-09-05 PROCEDURE — G0378 HOSPITAL OBSERVATION PER HR: HCPCS

## 2018-09-05 PROCEDURE — 82962 GLUCOSE BLOOD TEST: CPT

## 2018-09-05 PROCEDURE — 85027 COMPLETE CBC AUTOMATED: CPT | Performed by: OBSTETRICS & GYNECOLOGY

## 2018-09-05 RX ORDER — HYDROCODONE BITARTRATE AND ACETAMINOPHEN 5; 325 MG/1; MG/1
1 TABLET ORAL EVERY 4 HOURS PRN
Qty: 25 TABLET | Refills: 0 | Status: SHIPPED | OUTPATIENT
Start: 2018-09-05 | End: 2018-09-10

## 2018-09-05 RX ORDER — SULFAMETHOXAZOLE AND TRIMETHOPRIM 800; 160 MG/1; MG/1
1 TABLET ORAL 2 TIMES DAILY
Qty: 28 TABLET | Refills: 0 | Status: SHIPPED | OUTPATIENT
Start: 2018-09-05 | End: 2018-09-19

## 2018-09-05 RX ADMIN — ASPIRIN 81 MG: 81 TABLET ORAL at 09:02

## 2018-09-05 RX ADMIN — ATORVASTATIN CALCIUM 40 MG: 40 TABLET, FILM COATED ORAL at 09:02

## 2018-09-05 RX ADMIN — SULFAMETHOXAZOLE AND TRIMETHOPRIM 160 MG: 800; 160 TABLET ORAL at 06:18

## 2018-09-05 RX ADMIN — HYDROCODONE BITARTRATE AND ACETAMINOPHEN 1 TABLET: 5; 325 TABLET ORAL at 02:56

## 2018-09-05 RX ADMIN — CETIRIZINE HYDROCHLORIDE 10 MG: 10 TABLET, FILM COATED ORAL at 09:02

## 2018-09-05 RX ADMIN — PANTOPRAZOLE SODIUM 40 MG: 40 TABLET, DELAYED RELEASE ORAL at 06:18

## 2018-09-05 RX ADMIN — HYDROCODONE BITARTRATE AND ACETAMINOPHEN 1 TABLET: 5; 325 TABLET ORAL at 09:02

## 2018-09-05 RX ADMIN — DOCUSATE SODIUM 100 MG: 100 CAPSULE, LIQUID FILLED ORAL at 09:02

## 2018-09-05 RX ADMIN — GLIPIZIDE 5 MG: 5 TABLET ORAL at 09:02

## 2018-09-05 NOTE — DISCHARGE SUMMARY
Discharge Summary    Admit Date: 9/4/2018  8:12 AM    Admit Diagnosis: Abscess of vulva [N76.4]    Date of Discharge:  9/5/2018    Discharge Diagnosis: Same        Hospital Course  Patient is a 43 y.o. female admitted secondary to Vulvar abscess. Patient doing better after I & D. Symptoms have improved. Patient tolerating a regular diet and ambulating without difficulty. She has been afebrile after one dose Vancomycin and now oral Bactrim DS. Will discharge to home today.         Vital Signs  Temp:  [97.2 °F (36.2 °C)-98.5 °F (36.9 °C)] 98.3 °F (36.8 °C)  Heart Rate:  [69-90] 78  Resp:  [13-22] 18  BP: (102-161)/(53-86) 102/54    Review of Systems    The following systems were reviewed and negative;  ENT, respiratory, cardiovascular, gastrointestinal, genitourinary, breast, endocrine and allergies / immunologic.    Physical Examination: General appearance - alert, well appearing, and in no distress  Pelvic - VULVA: normal appearing vulva with no masses, tenderness or lesions, vulvar edema , no erythema.  Incision is C/D/I.  Packing removed and new packing placed.        Condition on Discharge:  Stable    Discharge Diet: ADA    Discharge planning:  Wound care instructions reviewed.  She is to change packing twice daily.  Importance of blood sugar control with wound healing reviewed.  F/U in office Friday or sooner if needed.  Awaiting culture results. Smoking cessation addressed but she declines to quit.  Resume all home medications.     Follow-up Appointment  Patient will follow up in clinic this week.        Shannan Farfan DO  09/05/18  10:20 AM

## 2018-09-05 NOTE — PAYOR COMM NOTE
"CONTACT:  ANDRESSA EVANS RN, BSN  UTILIZATION MANAGEMENT DEPT.  [facility]  [address], [address]  PHONE:  [phone]  FAX: [phone]      REQUEST FOR INPATIENT AUTHORIZATION    Carlos Bergman  (43 y.o. Female)     Date of Birth Social Security Number Address Home Phone MRN    1975  [address] [place] [address] [phone] [id number]    Bahai Marital Status          Other        Admission Date Admission Type Admitting Provider Attending Provider Department, Room/Bed    9/4/18 Emergency Shannan Farfan, Shannan Walls DO [facility], W234/1    Discharge Date Discharge Disposition Discharge Destination                       Attending Provider:  Shannan Farfan DO    Allergies:  No Known Allergies    Isolation:  None   Infection:  None   Code Status:  CPR    Ht:  175.3 cm (69\")   Wt:  127 kg (280 lb)    Admission Cmt:  None   Principal Problem:  Abscess, vulva [N76.4] More...                 Active Insurance as of 9/4/2018     Primary Coverage     Payor Plan Insurance Group Employer/Plan Group    [employer] [place] [employer] [place]      Payor Plan Address Payor Plan Phone Number Effective From Effective To    [address] [address] [phone] 7/3/2017     [facility] [id number]       Subscriber Name Subscriber Birth Date Member ID       CARLOS BERGMAN 1975 [id number]                 Emergency Contacts      (Rel.) Home Phone Work Phone Mobile Phone    Zainab Saravia (Mother) [phone]    BergmanNicholas trevizo (Spouse) [phone] -- --               History & Physical      Shannan Farfan DO at 9/4/2018 12:12 PM          Chief complaint   Chief Complaint   Patient presents with   • Abscess       History of Present Illness: Patient is a 43 y.o. female who presents for pain and swelling on Vulva. She states it started 3 days ago on Saturday and has progressively gotten worse.  She states she has had this " before and she has a history of MRSA      Past Medical History:   Diagnosis Date   • Abnormal Pap smear of cervix    • Abnormal vaginal bleeding    • Arthritis    • Asthma    • Cervical cancer (CMS/HCC) 2000    Had cryo surgery and part of cervix removed   • Diabetes mellitus (CMS/HCC)     Takes oral medication   • Hyperlipidemia    • Hypertension    • Migraine    • Rh incompatibility    • Substance abuse     Nancy Beard   • TIA (transient ischemic attack) 2012   • Urogenital trichomoniasis 2010    Was tx'd       Past Surgical History:   Procedure Laterality Date   • CERVICAL BIOPSY     • CHOLECYSTECTOMY     • DILATATION AND CURETTAGE     • ENDOMETRIAL ABLATION     • GYNECOLOGIC CRYOSURGERY     • HYSTEROSCOPY N/A 11/16/2016    Procedure: HYSTEROSCOPY, D&C,  ENDOMETRIAL ABLATION;  Surgeon: Vikram Clark DO;  Location: [facility] OR;  Service:    • TUBAL ABDOMINAL LIGATION     • TUBAL FILSHIE CLIPPING LAPAROSCOPIC WITH ENDOMETRIAL ABLATION N/A 11/16/2016    Procedure: TUBAL FILSHIE CLIPPING LAPAROSCOPIC;  Surgeon: Vikram Clark DO;  Location: [facility] OR;  Service:        Family History   Problem Relation Age of Onset   • Breast cancer Maternal Grandmother    • Breast cancer Maternal Aunt        Social History   Substance Use Topics   • Smoking status: Current Every Day Smoker     Packs/day: 0.50     Years: 35.00     Types: Cigarettes     Start date: 9/4/1985   • Smokeless tobacco: Never Used   • Alcohol use 1.2 oz/week     2 Cans of beer per week      Comment: ocas       Prescriptions Prior to Admission   Medication Sig Dispense Refill Last Dose   • amitriptyline (ELAVIL) 25 MG tablet Take 25 mg by mouth Every Night.   11/15/2016 at 2300   • amLODIPine (NORVASC) 5 MG tablet Take 5 mg by mouth Every Night.   11/15/2016 at 2300   • aspirin 81 MG EC tablet Take 81 mg by mouth Daily.   11/15/2016 at 0800   • atorvastatin (LIPITOR) 20 MG tablet Take 20 mg by mouth Daily.   11/15/2016 at 2300   •  cetirizine (zyrTEC) 10 MG tablet Take 10 mg by mouth Daily.   11/15/2016 at 0800   • glipiZIDE (GLUCOTROL) 5 MG tablet Take 5 mg by mouth 2 (Two) Times a Day Before Meals.   11/15/2016 at 1600   • losartan-hydrochlorothiazide (HYZAAR) 100-25 MG per tablet Take 1 tablet by mouth Daily.   11/16/2016 at 0900   • omeprazole (priLOSEC) 20 MG capsule Take 20 mg by mouth Daily.   11/15/2016 at 0800       Allergies:  Patient has no known allergies.      Vital Signs  Temp:  [98 °F (36.7 °C)-98.6 °F (37 °C)] 98.2 °F (36.8 °C)  Heart Rate:  [] 84  Resp:  [18] 18  BP: (111-183)/(70-99) 142/70      Review of Systems    The following systems were reviewed and negative;  ENT, respiratory, chest pain, gastrointestinal.  She admits to vulvar pain and swelling w/o discharge.  She admits to DM and HTN.      Physical Exam:      General Appearance:    Alert, cooperative, in no acute distress   Head:    Normocephalic, without obvious abnormality, atraumatic   Eyes:            Lids and lashes normal, conjunctivae and sclerae normal, no   icterus, no pallor, corneas clear, PERRLA               Back:     range of motion normal   Lungs:     Clear to auscultation,respirations regular, even and                   unlabored    Heart:    Regular rhythm and normal rate, normal S1 and S2, no            murmur, no gallop, no rub, no click   Breast Exam:    Deferred   Abdomen:     Normal bowel sounds, no masses, no organomegaly, soft        non-tender, non-distended, no guarding, no rebound                 tenderness   Genitalia:   4 x 4 cm abscess Right vulva with erythema.  Tender to touch.    Extremities:   Moves all extremities well, no edema, no cyanosis, no              redness   Pulses:   Pulses palpable and equal bilaterally   Skin:   No bleeding, bruising or rash                 Assessment: Patient is a 43 y.o. female who presents with a Right vulvar abscess.  I have recommended I & D under MAC and wound care thereafter.  Risk of  anesthesia, infection, bleeding and scar formation are reviewed.  Pt admits to understanding all of the above and desires to proceed.     Plan of Care: Proceed with Incision and Drainage of Right Vulvar abscess.       Shannan Farfan DO  09/04/18  12:12 PM    Electronically signed by Shannan Farfan DO at 9/4/2018 12:20 PM          Emergency Department Notes      Hugh Kern PA at 9/4/2018  8:32 AM          Subjective     Abscess   Location:  Pelvis  Pelvic abscess location:  Vulva  Size:  Quarter size  Abscess quality: painful    Abscess quality: not draining    Red streaking: no    Duration:  3 days  Progression:  Worsening  Pain details:     Severity:  Moderate    Timing:  Constant  Chronicity:  New  Context: diabetes    Associated symptoms: fever (subjective)        Review of Systems   Constitutional: Positive for fever (subjective).   HENT: Negative.    Respiratory: Negative.    Cardiovascular: Negative.  Negative for chest pain.   Gastrointestinal: Negative.  Negative for abdominal pain.   Endocrine: Negative.    Genitourinary: Negative.  Negative for dysuria.   Skin: Negative.    Neurological: Negative.    Psychiatric/Behavioral: Negative.    All other systems reviewed and are negative.      Past Medical History:   Diagnosis Date   • Abnormal vaginal bleeding    • Arthritis    • Asthma    • Diabetes mellitus (CMS/HCC)    • Hypertension        No Known Allergies    Past Surgical History:   Procedure Laterality Date   • CERVICAL BIOPSY     • CHOLECYSTECTOMY     • HYSTEROSCOPY N/A 11/16/2016    Procedure: HYSTEROSCOPY, D&C,  ENDOMETRIAL ABLATION;  Surgeon: Vikram Clark DO;  Location: [facility] OR;  Service:    • TUBAL FILSHIE CLIPPING LAPAROSCOPIC WITH ENDOMETRIAL ABLATION N/A 11/16/2016    Procedure: TUBAL FILSHIE CLIPPING LAPAROSCOPIC;  Surgeon: Vikram Clark DO;  Location: [facility] OR;  Service:        Family History   Problem Relation Age of Onset   • Breast cancer Maternal Grandmother     • Breast cancer Maternal Aunt        Social History     Social History   • Marital status:      Social History Main Topics   • Smoking status: Current Every Day Smoker     Packs/day: 0.50     Years: 35.00     Types: Electronic Cigarette   • Alcohol use Yes      Comment: ocas   • Drug use: Yes     Frequency: 3.0 times per week     Types: Marijuana      Comment: 11/14/16   • Sexual activity: Defer     Other Topics Concern   • Not on file           Objective   Physical Exam   Constitutional: She is oriented to person, place, and time. She appears well-developed and well-nourished. No distress.   HENT:   Head: Normocephalic and atraumatic.   Right Ear: External ear normal.   Left Ear: External ear normal.   Nose: Nose normal.   Eyes: Pupils are equal, round, and reactive to light. Conjunctivae and EOM are normal.   Neck: Normal range of motion. Neck supple. No JVD present. No tracheal deviation present.   Cardiovascular: Normal rate, regular rhythm and normal heart sounds.    No murmur heard.  Pulmonary/Chest: Effort normal and breath sounds normal. No respiratory distress. She has no wheezes.   Abdominal: Soft. Bowel sounds are normal. There is no tenderness.   Musculoskeletal: Normal range of motion. She exhibits no edema or deformity.   Neurological: She is alert and oriented to person, place, and time. No cranial nerve deficit.   Skin: Skin is warm and dry. No rash noted. She is not diaphoretic. No erythema. No pallor.   Quarter size abscess right kassie   Psychiatric: She has a normal mood and affect. Her behavior is normal. Thought content normal.   Nursing note and vitals reviewed.      Procedures          ED Course                  MDM  Number of Diagnoses or Management Options  Abscess, vulva: new and requires workup     Amount and/or Complexity of Data Reviewed  Clinical lab tests: ordered and reviewed  Discuss the patient with other providers: yes    Risk of Complications, Morbidity, and/or  Mortality  Presenting problems: moderate          Final diagnoses:   Abscess, vulva            Hugh Kern PA  09/04/18 0949      Electronically signed by Hugh Kern PA at 9/4/2018  9:49 AM     Nhi Henley, RN at 9/4/2018  8:44 AM        Patient sent to restroom to attempt to obtain urine sample. Given instructions regarding clean catch sample, verbalizes understanding. Awaiting further orders, will continue to monitor and follow plan of care.      Nhi Henley, RN  09/04/18 0844      Electronically signed by Nhi Henley, RN at 9/4/2018  8:44 AM     Jasmin Zaldivar, RN at 9/4/2018  9:41 AM        Provider speaking to pt, pt to be admitted, pt verb. understanding     Jasmin Zaldivar RN  09/04/18 0941      Electronically signed by Jasmin Zaldivar RN at 9/4/2018  9:41 AM     Jasmin Zaldivar, RN at 9/4/2018  9:45 AM        Pt assisted to bathroom, tolerated well     Jasmin Zaldivar RN  09/04/18 0945      Electronically signed by Jasmin Zaldivar RN at 9/4/2018  9:45 AM     Jasmin Zaldivar RN at 9/4/2018 10:26 AM        Pt alert, oriented, stable, nad noted, iv vancomycin infusing via pump without any difficulty or abnormality  Noted to site, no complaints voiced from pt     Jasmin Zaldivar RN  09/04/18 1027      Electronically signed by Jasmin Zaldivar RN at 9/4/2018 10:27 AM             ICU Vital Signs     Row Name 09/05/18 0715 09/05/18 0000 09/04/18 1920 09/04/18 1845 09/04/18 1745       Vitals    Temp 98.3 °F (36.8 °C) 97.9 °F (36.6 °C) 98.1 °F (36.7 °C) 98.4 °F (36.9 °C) 98 °F (36.7 °C)    Temp src Oral  -- Oral Oral Oral    Pulse 78 83 90 82 80    Heart Rate Source Monitor  --  -- Monitor Monitor    Resp 18 18 18 18 18    Resp Rate Source Visual  --  -- Visual Visual    /54 135/69 161/86   pt has been off the unit to smoke, walking halls 126/79 114/53    BP Location Right arm  --  -- Right arm Right arm    BP Method Automatic  --  --  Automatic Automatic    Patient Position Lying  --  -- Lying Lying       Oxygen Therapy    SpO2 97 %  -- 97 % 98 % 98 %    Pulse Oximetry Type  --  -- Intermittent Continuous Continuous    Device (Oxygen Therapy)  --  -- room air room air room air    Row Name 09/04/18 1645 09/04/18 1615 09/04/18 1545 09/04/18 1530 09/04/18 1515       Vitals    Temp 98.2 °F (36.8 °C) 97.8 °F (36.6 °C) 97.4 °F (36.3 °C) 97.8 °F (36.6 °C) 97.6 °F (36.4 °C)    Temp src Oral Axillary Axillary Axillary Axillary    Pulse 79 71 71 81 70    Heart Rate Source Monitor Monitor Monitor Monitor Monitor    Resp 18 18 16 16 16    Resp Rate Source Visual Visual Visual Visual Visual    /70 120/64 130/85 127/76 140/85    BP Location Right arm Right arm Right arm Right arm Right arm    BP Method Automatic Automatic Automatic Automatic Automatic    Patient Position Lying Lying Lying Lying Lying       Oxygen Therapy    SpO2 97 % 94 % 96 % 97 % 96 %    Pulse Oximetry Type Continuous Continuous Continuous Continuous Continuous    Device (Oxygen Therapy) room air room air room air room air room air    Row Name 09/04/18 1500 09/04/18 1449 09/04/18 1444 09/04/18 1439 09/04/18 1434       Vitals    Temp 97.6 °F (36.4 °C) 97.4 °F (36.3 °C)  --  --  --    Temp src Axillary Tympanic  --  --  --    Pulse 72 69 74 83 75    Heart Rate Source Monitor Monitor Monitor Monitor Monitor    Resp 16 17 16 15 13    Resp Rate Source Visual Monitor Monitor Monitor Monitor    /77 132/71 137/80 133/80 135/76    BP Location Right arm Right arm Right arm Right arm Right arm    BP Method Automatic Automatic Automatic Automatic Automatic    Patient Position Lying Lying Lying Lying Lying       Oxygen Therapy    SpO2 97 % 97 % 98 % 98 % 99 %    Pulse Oximetry Type Continuous Continuous Continuous Continuous Continuous    Device (Oxygen Therapy) room air room air nasal cannula nasal cannula nasal cannula    Flow (L/min)  --  -- 3 3 3    Row Name 09/04/18 1433 09/04/18 1429  "09/04/18 1427 09/04/18 1424 09/04/18 1419       Vitals    Temp  --  --  --  -- 97.2 °F (36.2 °C)    Temp src  --  --  --  -- Tympanic    Pulse 79 84 72 74 76    Heart Rate Source Monitor Monitor Monitor Monitor Monitor    Resp 22 16 18 13 13    Resp Rate Source Monitor Monitor Visual Monitor Monitor    BP  -- 117/70  -- 107/63 102/68    BP Location  -- Right arm  -- Right arm Right arm    BP Method  -- Automatic  -- Automatic Automatic    Patient Position  -- Lying  -- Lying Lying       Oxygen Therapy    SpO2  -- 100 % 97 % 95 % (!)  87 %    Pulse Oximetry Type  -- Continuous  -- Continuous Continuous    Device (Oxygen Therapy)  -- nasal cannula nasal cannula nasal cannula nasal cannula    Flow (L/min)  -- --   duo nebulizer treatment in process. 8 8 6    Row Name 09/04/18 1317 09/04/18 1300                Height and Weight    Height 175.3 cm (69\")  --       Weight 127 kg (280 lb)  --       Weight Method Stated  --       Ideal Body Weight (IBW) (kg) 66.43  --       BSA (Calculated - sq m) 2.38 sq meters  --       BMI (Calculated) 41.3  --       Weight in (lb) to have BMI = 25 168.9  --          Vitals    Temp 98.5 °F (36.9 °C) 98.1 °F (36.7 °C)       Temp src Oral Oral       Pulse 85 86       Heart Rate Source Monitor Monitor       Resp 20 18       Resp Rate Source Visual Visual       /62 144/71       BP Location Right arm Right arm       BP Method Automatic Automatic       Patient Position Lying Lying          Oxygen Therapy    SpO2 95 %  --       Pulse Oximetry Type Intermittent  --       Device (Oxygen Therapy) room air  --           Intake & Output (last day)       09/04 0701 - 09/05 0700 09/05 0701 - 09/06 0700    I.V. (mL/kg) 250 (2)     IV Piggyback 2700     Total Intake(mL/kg) 2950 (23.2)     Net +2950                Lines, Drains & Airways    Active LDAs     None         Inactive LDAs     Name:   Placement date:   Placement time:   Removal date:   Removal time:   Site:   Days:    [REMOVED] Peripheral " IV 09/04/18 0840 Left Antecubital  09/04/18    0840    09/05/18    0000    Antecubital    less than 1                Hospital Medications (all)       Dose Frequency Start End    amLODIPine (NORVASC) tablet 5 mg 5 mg Nightly 9/4/2018     Sig - Route: Take 1 tablet by mouth Every Night. - Oral    aspirin EC tablet 81 mg 81 mg Daily 9/4/2018     Sig - Route: Take 1 tablet by mouth Daily. - Oral    atorvastatin (LIPITOR) tablet 40 mg 40 mg Daily 9/4/2018     Sig - Route: Take 1 tablet by mouth Daily. - Oral    calcium carbonate (TUMS) chewable tablet 500 mg (200 mg elemental) 2 tablet 2 Times Daily PRN 9/4/2018     Sig - Route: Chew 1,000 mg 2 (Two) Times a Day As Needed for Heartburn. - Oral    cetirizine (zyrTEC) tablet 10 mg 10 mg Daily 9/4/2018     Sig - Route: Take 1 tablet by mouth Daily. - Oral    dextrose (D50W) 25 g/ 50mL Intravenous Solution 25 g 25 g Every 15 Minutes PRN 9/4/2018     Sig - Route: Infuse 50 mL into a venous catheter Every 15 (Fifteen) Minutes As Needed for Low Blood Sugar (Blood Sugar Less Than 70). - Intravenous    dextrose (GLUTOSE) oral gel 15 g 15 g Every 15 Minutes PRN 9/4/2018     Sig - Route: Take 15 g by mouth Every 15 (Fifteen) Minutes As Needed for Low Blood Sugar (Blood sugar less than 70). - Oral    docusate sodium (COLACE) capsule 100 mg 100 mg 2 Times Daily 9/4/2018     Sig - Route: Take 1 capsule by mouth 2 (Two) Times a Day. - Oral    glipiZIDE (GLUCOTROL) tablet 5 mg 5 mg Daily 9/4/2018     Sig - Route: Take 1 tablet by mouth Daily. - Oral    glucagon (human recombinant) (GLUCAGEN DIAGNOSTIC) injection 1 mg 1 mg As Needed 9/4/2018     Sig - Route: Inject 1 mg under the skin into the appropriate area as directed As Needed (Blood Glucose Less Than 70). - Subcutaneous    hydrochlorothiazide (HYDRODIURIL) tablet 25 mg 25 mg Daily 9/4/2018     Sig - Route: Take 1 tablet by mouth Daily. - Oral    HYDROcodone-acetaminophen (NORCO) 5-325 MG per tablet 1 tablet 1 tablet Every 4 Hours  PRN 9/4/2018 9/14/2018    Sig - Route: Take 1 tablet by mouth Every 4 (Four) Hours As Needed for Moderate Pain . - Oral    HYDROmorphone (DILAUDID) injection 0.5 mg 0.5 mg Every 2 Hours PRN 9/4/2018 9/14/2018    Sig - Route: Infuse 0.5 mL into a venous catheter Every 2 (Two) Hours As Needed for Severe Pain . - Intravenous    insulin aspart (novoLOG) injection 0-9 Units 0-9 Units 4 Times Daily Before Meals & Nightly 9/4/2018     Sig - Route: Inject 0-9 Units under the skin into the appropriate area as directed 4 (Four) Times a Day Before Meals & at Bedtime. - Subcutaneous    ipratropium-albuterol (DUO-NEB) nebulizer solution 3 mL 3 mL Once As Needed 9/4/2018 9/4/2018    Sig - Route: Take 3 mL by nebulization 1 (One) Time As Needed for Wheezing or Shortness of Air (bronchospasm). - Nebulization    ketorolac (TORADOL) injection 30 mg 30 mg Every 6 Hours PRN 9/4/2018 9/5/2018    Sig - Route: Infuse 1 mL into a venous catheter Every 6 (Six) Hours As Needed for Moderate Pain . - Intravenous    lactated ringers infusion 125 mL/hr Continuous 9/4/2018     Sig - Route: Infuse 125 mL/hr into a venous catheter Continuous. - Intravenous    losartan (COZAAR) tablet 100 mg 100 mg Daily 9/4/2018     Sig - Route: Take 2 tablets by mouth Daily. - Oral    montelukast (SINGULAIR) tablet 10 mg 10 mg Nightly 9/4/2018     Sig - Route: Take 1 tablet by mouth Every Night. - Oral    Morphine sulfate (PF) injection 4 mg 4 mg Once 9/4/2018 9/4/2018    Sig - Route: Infuse 2 mL into a venous catheter 1 (One) Time. - Intravenous    nicotine (NICODERM CQ) 21 MG/24HR patch 1 patch 1 patch Daily 9/4/2018     Sig - Route: Place 1 patch on the skin as directed by provider Daily. - Transdermal    ondansetron (ZOFRAN) injection 4 mg 4 mg Once 9/4/2018 9/4/2018    Sig - Route: Infuse 2 mL into a venous catheter 1 (One) Time. - Intravenous    ondansetron (ZOFRAN) injection 4 mg 4 mg Every 6 Hours PRN 9/4/2018     Sig - Route: Infuse 2 mL into a venous  catheter Every 6 (Six) Hours As Needed for Nausea or Vomiting. - Intravenous    pantoprazole (PROTONIX) EC tablet 40 mg 40 mg Every Morning 9/5/2018     Sig - Route: Take 1 tablet by mouth Every Morning. - Oral    piperacillin-tazobactam (ZOSYN) 3.375 g/100 mL 0.9% NS IVPB (mbp) 3.375 g Once 9/4/2018 9/4/2018    Sig - Route: Infuse 100 mL into a venous catheter 1 (One) Time. - Intravenous    Cosign for Ordering: Accepted by Moshe Bruce MD on 9/4/2018  7:37 PM    sennosides-docusate sodium (SENOKOT-S) 8.6-50 MG tablet 2 tablet 2 tablet Nightly 9/4/2018     Sig - Route: Take 2 tablets by mouth Every Night. - Oral    sodium chloride 0.9 % bolus 1,000 mL 1,000 mL Once 9/4/2018 9/4/2018    Sig - Route: Infuse 1,000 mL into a venous catheter 1 (One) Time. - Intravenous    Cosign for Ordering: Accepted by Moshe Bruce MD on 9/4/2018  7:37 PM    sodium chloride 0.9 % flush 1-10 mL 1-10 mL As Needed 9/4/2018     Sig - Route: Infuse 1-10 mL into a venous catheter As Needed for Line Care. - Intravenous    sulfamethoxazole-trimethoprim (BACTRIM DS,SEPTRA DS) 800-160 MG per tablet 160 mg 1 tablet Every 12 Hours 9/4/2018 9/6/2018    Sig - Route: Take 1 tablet by mouth Every 12 (Twelve) Hours. - Oral    vancomycin (VANCOCIN) 2,000 mg in sodium chloride 0.9 % 500 mL IVPB 2,000 mg Once 9/4/2018 9/4/2018    Sig - Route: Infuse 2,000 mg into a venous catheter 1 (One) Time. - Intravenous    Cosign for Ordering: Accepted by Moshe Bruce MD on 9/4/2018  7:37 PM    fentaNYL citrate (PF) (SUBLIMAZE) injection 50 mcg (Discontinued) 50 mcg Every 5 Minutes PRN 9/4/2018 9/4/2018    Sig - Route: Infuse 1 mL into a venous catheter Every 5 (Five) Minutes As Needed for Moderate Pain  or Severe Pain . - Intravenous    Reason for Discontinue: Patient Transfer    losartan (COZAAR) 100 mg, hydrochlorothiazide (HYDRODIURIL) 25 mg for HYZAAR 100-25 (Discontinued)  Daily 9/4/2018 9/4/2018    Sig - Route: Take  by mouth Daily. -  "Oral    Reason for Discontinue: Formulary change    meperidine (DEMEROL) injection 12.5 mg (Discontinued) 12.5 mg Every 5 Minutes PRN 9/4/2018 9/4/2018    Sig - Route: Infuse 0.25 mL into a venous catheter Every 5 (Five) Minutes As Needed for Shivering (May repeat x 1). - Intravenous    Reason for Discontinue: Patient Transfer    ondansetron (ZOFRAN) injection 4 mg (Discontinued) 4 mg Once As Needed 9/4/2018 9/4/2018    Sig - Route: Infuse 2 mL into a venous catheter 1 (One) Time As Needed for Nausea or Vomiting (may repeat times one dose). - Intravenous    Reason for Discontinue: Patient Transfer    oxyCODONE-acetaminophen (PERCOCET) 5-325 MG per tablet 1 tablet (Discontinued) 1 tablet Once As Needed 9/4/2018 9/4/2018    Sig - Route: Take 1 tablet by mouth 1 (One) Time As Needed (pain). - Oral    Reason for Discontinue: Patient Transfer    sodium chloride 0.9 % flush 1-10 mL (Discontinued) 1-10 mL As Needed 9/4/2018 9/4/2018    Sig - Route: Infuse 1-10 mL into a venous catheter As Needed for Line Care. - Intravenous    Reason for Discontinue: Patient Transfer    sodium chloride 0.9 % flush 1-10 mL (Discontinued) 1-10 mL As Needed 9/4/2018 9/4/2018    Sig - Route: Infuse 1-10 mL into a venous catheter As Needed for Line Care. - Intravenous    Reason for Discontinue: Patient Transfer    sodium chloride 0.9 % flush 10 mL (Discontinued) 10 mL As Needed 9/4/2018 9/4/2018    Sig - Route: Infuse 10 mL into a venous catheter As Needed for Line Care. - Intravenous    Cosign for Ordering: Accepted by Moshe Bruce MD on 9/4/2018  7:37 PM    Linked Group 1:  \"And\" Linked Group Details        sterile water irrigation solution (Discontinued)  As Needed 9/4/2018 9/4/2018    Sig: As Needed.    Reason for Discontinue: Patient Discharge    vancomycin (VANCOCIN) 1,750 mg in sodium chloride 0.9 % 500 mL IVPB (Discontinued) 1,750 mg Every 12 Hours 9/4/2018 9/4/2018    Sig - Route: Infuse 1,750 mg into a venous catheter Every 12 " (Twelve) Hours. - Intravenous    vancomycin (VANCOCIN) 2,000 mg in sodium chloride 0.9 % 500 mL IVPB (Discontinued) 2,000 mg Every 24 Hours 9/5/2018 9/4/2018    Sig - Route: Infuse 2,000 mg into a venous catheter Daily. - Intravenous          Lab Results (all)     Procedure Component Value Units Date/Time    Blood Culture - Blood, [[id number]]  (Normal) Collected:  09/04/18 0900    Specimen:  Blood from Hand, Left Updated:  09/05/18 0931     Blood Culture No growth at 24 hours    Blood Culture - Blood, [[id number]]  (Normal) Collected:  09/04/18 0855    Specimen:  Blood from Arm, Right Updated:  09/05/18 0931     Blood Culture No growth at 24 hours    Culture, Routine - Swab, Vulva [[id number]]  (Normal) Collected:  09/04/18 1406    Specimen:  Swab from Vulva Updated:  09/05/18 0731     Culture No growth at 24 hours     Gram Stain Result Few (2+) WBCs seen      Rare (1+) Gram positive cocci in pairs and chains      Rare (1+) Gram positive bacilli    CBC (No Diff) [[id number]]  (Abnormal) Collected:  09/05/18 0615    Specimen:  Blood Updated:  09/05/18 0633     WBC 11.83 10*3/mm3      RBC 4.19 (L) 10*6/mm3      Hemoglobin 12.2 g/dL      Hematocrit 37.4 %      MCV 89.3 fL      MCH 29.1 pg      MCHC 32.6 (L) g/dL      RDW 12.6 %      RDW-SD 40.8 fl      MPV 11.4 (H) fL      Platelets 236 10*3/mm3     POC Glucose Once [[id number]]  (Normal) Collected:  09/05/18 0615    Specimen:  Blood Updated:  09/05/18 0629     Glucose 107 mg/dL     Narrative:       Meter: [id number] : [id number] [employer]    POC Glucose Once [[id number]]  (Normal) Collected:  09/04/18 2328    Specimen:  Blood Updated:  09/04/18 2354     Glucose 74 mg/dL     Narrative:       Meter: [id number] : [id number] [employer]    POC Glucose Once [[id number]]  (Normal) Collected:  09/04/18 1626    Specimen:  Blood Updated:  09/04/18 1632     Glucose 111 mg/dL     Narrative:       Meter: [id number] : [id number] Monday Karina PADGETT    POC Glucose Once  [[id number]]  (Normal) Collected:  09/04/18 1251    Specimen:  Blood Updated:  09/04/18 1257     Glucose 114 mg/dL     Narrative:       Meter: [id number] : [id number] Monday Karina PADGETT    Comprehensive Metabolic Panel [[id number]]  (Abnormal) Collected:  09/04/18 0840    Specimen:  Blood from Arm, Left Updated:  09/04/18 0909     Glucose 169 (H) mg/dL      BUN 12 mg/dL      Creatinine 0.89 mg/dL      Sodium 135 mmol/L      Potassium 3.9 mmol/L      Chloride 109 mmol/L      CO2 22.5 (L) mmol/L      Calcium 8.9 mg/dL      Total Protein 7.1 g/dL      Albumin 4.10 g/dL      ALT (SGPT) 26 U/L      AST (SGOT) 16 U/L      Alkaline Phosphatase 74 U/L      Comment: Note New Reference Ranges        Total Bilirubin 0.4 mg/dL      eGFR Non African Amer 69 mL/min/1.73      Globulin 3.0 gm/dL      A/G Ratio 1.4 (L) g/dL      BUN/Creatinine Ratio 13.5     Anion Gap 3.5 (L) mmol/L     Osmolality, Calculated [[id number]]  (Normal) Collected:  09/04/18 0840    Specimen:  Blood from Arm, Left Updated:  09/04/18 0909     Osmolality Calc 273.8 mOsm/kg     POC Glucose Once [[id number]]  (Abnormal) Collected:  09/04/18 0858    Specimen:  Blood Updated:  09/04/18 0909     Glucose 154 (H) mg/dL     Narrative:       Meter: [id number] : [id number] cheo desouza    Urinalysis, Microscopic Only - Urine, Clean Catch [[id number]]  (Abnormal) Collected:  09/04/18 0846    Specimen:  Urine from Urine, Clean Catch Updated:  09/04/18 0909     RBC, UA 0-2 /HPF      WBC, UA 3-5 (A) /HPF      Bacteria, UA 2+ (A) /HPF      Squamous Epithelial Cells, UA 13-20 (A) /HPF      Hyaline Casts, UA None Seen /LPF      Methodology Automated Microscopy    Urinalysis With Culture If Indicated - Urine, Clean Catch [[id number]]  (Abnormal) Collected:  09/04/18 0846    Specimen:  Urine from Urine, Clean Catch Updated:  09/04/18 0908     Color, UA Yellow     Appearance, UA Cloudy (A)     pH, UA 6.0     Specific Gravity, UA 1.010     Glucose, UA Negative     Ketones, UA  Negative     Bilirubin, UA Negative     Blood, UA Negative     Protein, UA Negative     Leuk Esterase, UA Large (3+) (A)     Nitrite, UA Negative     Urobilinogen, UA 0.2 E.U./dL    Pregnancy, Urine - Urine, Clean Catch [[id number]]  (Normal) Collected:  09/04/18 0846    Specimen:  Urine from Urine, Clean Catch Updated:  09/04/18 0908     HCG, Urine QL Negative    Narrative:       Diluted specimens may cause false negative results.    Lactic Acid, Plasma [[id number]]  (Normal) Collected:  09/04/18 0840    Specimen:  Blood from Arm, Left Updated:  09/04/18 0904     Lactate 1.4 mmol/L     CBC & Differential [[id number]] Collected:  09/04/18 0840    Specimen:  Blood Updated:  09/04/18 0848    Narrative:       The following orders were created for panel order CBC & Differential.  Procedure                               Abnormality         Status                     ---------                               -----------         ------                     CBC Auto Differential[[id number]]        Abnormal            Final result                 Please view results for these tests on the individual orders.    CBC Auto Differential [[id number]]  (Abnormal) Collected:  09/04/18 0840    Specimen:  Blood from Arm, Left Updated:  09/04/18 0848     WBC 15.72 (H) 10*3/mm3      RBC 4.64 10*6/mm3      Hemoglobin 13.5 g/dL      Hematocrit 41.2 %      MCV 88.8 fL      MCH 29.1 pg      MCHC 32.8 (L) g/dL      RDW 12.6 %      RDW-SD 40.1 fl      MPV 11.3 (H) fL      Platelets 273 10*3/mm3      Neutrophil % 78.9 (H) %      Lymphocyte % 13.9 (L) %      Monocyte % 5.9 %      Eosinophil % 0.7 %      Basophil % 0.3 %      Immature Grans % 0.3 %      Neutrophils, Absolute 12.42 (H) 10*3/mm3      Lymphocytes, Absolute 2.19 10*3/mm3      Monocytes, Absolute 0.92 (H) 10*3/mm3      Eosinophils, Absolute 0.11 10*3/mm3      Basophils, Absolute 0.04 10*3/mm3      Immature Grans, Absolute 0.04 (H) 10*3/mm3           Imaging Results (all)     None         Orders (all)     Start     Ordered    09/05/18 1033  Discontinue IV  Once      09/05/18 1034    09/05/18 1032  Discharge patient  Once      09/05/18 1034    09/05/18 0800  Wound Care  2 Times Daily      09/04/18 1150    09/05/18 0700  pantoprazole (PROTONIX) EC tablet 40 mg  Every Morning      09/04/18 1423    09/05/18 0630  POC Glucose Once  Once      09/05/18 0629    09/05/18 0600  CBC (No Diff)  Morning Draw      09/04/18 1512    09/05/18 0000  vancomycin (VANCOCIN) 2,000 mg in sodium chloride 0.9 % 500 mL IVPB  Every 24 Hours,   Status:  Discontinued      09/04/18 1138    09/05/18 0000  HYDROcodone-acetaminophen (NORCO) 5-325 MG per tablet  Every 4 Hours PRN      09/05/18 1034    09/05/18 0000  sulfamethoxazole-trimethoprim (BACTRIM DS) 800-160 MG per tablet  2 Times Daily      09/05/18 1034    09/05/18 0000  Discharge Follow-up with Specialty: gynecology; 2 Days      09/05/18 1034    09/05/18 0000  Discharge Dressing / Wound Instructions     Comments:  Instructions: wet to dry packing twice daily    09/05/18 1034    09/04/18 2355  POC Glucose Once  Once      09/04/18 2354    09/04/18 2100  sennosides-docusate sodium (SENOKOT-S) 8.6-50 MG tablet 2 tablet  Nightly      09/04/18 1150    09/04/18 2100  docusate sodium (COLACE) capsule 100 mg  2 Times Daily      09/04/18 1150    09/04/18 2100  vancomycin (VANCOCIN) 1,750 mg in sodium chloride 0.9 % 500 mL IVPB  Every 12 Hours,   Status:  Discontinued      09/04/18 1153    09/04/18 2100  amLODIPine (NORVASC) tablet 5 mg  Nightly      09/04/18 1423    09/04/18 2100  montelukast (SINGULAIR) tablet 10 mg  Nightly      09/04/18 1423    09/04/18 1800  sulfamethoxazole-trimethoprim (BACTRIM DS,SEPTRA DS) 800-160 MG per tablet 160 mg  Every 12 Hours      09/04/18 1455    09/04/18 1730  insulin aspart (novoLOG) injection 0-9 Units  4 Times Daily Before Meals & Nightly      09/04/18 1639    09/04/18 1700  POC Glucose 4x Daily AC & at Bedtime  4 Times Daily Before Meals &  at Bedtime      09/04/18 1150    09/04/18 1638  Notify Provider of event. Communicate needs and document in EMR.  Once      09/04/18 1639    09/04/18 1638  Document event and patients response to treatment in EMR, any medications given to be documented on MAR.  Once      09/04/18 1639    09/04/18 1633  POC Glucose Once  Once      09/04/18 1632    09/04/18 1600  aspirin EC tablet 81 mg  Daily      09/04/18 1423    09/04/18 1600  glipiZIDE (GLUCOTROL) tablet 5 mg  Daily      09/04/18 1423    09/04/18 1600  atorvastatin (LIPITOR) tablet 40 mg  Daily      09/04/18 1423    09/04/18 1600  cetirizine (zyrTEC) tablet 10 mg  Daily      09/04/18 1423    09/04/18 1600  Incentive Spirometry  Every 2 Hours While Awake      09/04/18 1512    09/04/18 1600  losartan (COZAAR) tablet 100 mg  Daily      09/04/18 1510    09/04/18 1600  hydrochlorothiazide (HYDRODIURIL) tablet 25 mg  Daily      09/04/18 1510    09/04/18 1513  Advance Diet as Tolerated  Until Discontinued      09/04/18 1512    09/04/18 1513  Place Sequential Compression Device  Once      09/04/18 1512    09/04/18 1513  Maintain Sequential Compression Device  Continuous      09/04/18 1512    09/04/18 1513  Activity - Ad Gladys  Until Discontinued      09/04/18 1512    09/04/18 1513  Diet Regular; Consistent Carbohydrate, Cardiac  Diet Effective Now      09/04/18 1512    09/04/18 1512  HYDROcodone-acetaminophen (NORCO) 5-325 MG per tablet 1 tablet  Every 4 Hours PRN      09/04/18 1512    09/04/18 1512  ketorolac (TORADOL) injection 30 mg  Every 6 Hours PRN      09/04/18 1512    09/04/18 1425  losartan (COZAAR) 100 mg, hydrochlorothiazide (HYDRODIURIL) 25 mg for HYZAAR 100-25  Daily,   Status:  Discontinued      09/04/18 1423    09/04/18 1419  Vital signs every 5 minutes for 15 minutes, every 15 minutes thereafter.  Once,   Status:  Canceled      09/04/18 1418    09/04/18 1419  Call Anesthesiologist for additional IV Fluid bolus for Hypotension/Tachycardia  Until Discontinued,    Status:  Canceled      09/04/18 1418    09/04/18 1419  Notify Anesthesia of Any Acute Changes in Patient Condition  Until Discontinued,   Status:  Canceled      09/04/18 1418    09/04/18 1419  Notify Anesthesia for Unrelieved Pain  Until Discontinued,   Status:  Canceled      09/04/18 1418    09/04/18 1419  Oxygen Therapy- Nasal Cannula; Titrate for SPO2: equal to or greater than, 96%, per policy  Continuous      09/04/18 1418    09/04/18 1419  Pulse Oximetry, Continuous  Continuous      09/04/18 1418    09/04/18 1419  POC Glucose STAT  STAT,   Status:  Canceled     Comments:  Notify Anesthesia if blood sugar is less than 80 mg/dL or greater than 180mg/dL      09/04/18 1418    09/04/18 1419  Once DC criteria to floor met, follow surgeon's orders.  Until Discontinued,   Status:  Canceled      09/04/18 1418    09/04/18 1419  Discharge patient from PACU when discharge criteria is met.  Until Discontinued,   Status:  Canceled      09/04/18 1418    09/04/18 1418  oxyCODONE-acetaminophen (PERCOCET) 5-325 MG per tablet 1 tablet  Once As Needed,   Status:  Discontinued      09/04/18 1418    09/04/18 1418  fentaNYL citrate (PF) (SUBLIMAZE) injection 50 mcg  Every 5 Minutes PRN,   Status:  Discontinued      09/04/18 1418    09/04/18 1418  ipratropium-albuterol (DUO-NEB) nebulizer solution 3 mL  Once As Needed      09/04/18 1418    09/04/18 1418  ondansetron (ZOFRAN) injection 4 mg  Once As Needed,   Status:  Discontinued      09/04/18 1418    09/04/18 1418  meperidine (DEMEROL) injection 12.5 mg  Every 5 Minutes PRN,   Status:  Discontinued      09/04/18 1418    09/04/18 1418  Code Status and Medical Interventions:  Continuous      09/04/18 1423    09/04/18 1418  Vital Signs Per Hospital Policy  Per Hospital Policy      09/04/18 1423    09/04/18 1418  Turn Cough Deep Breathe  Once      09/04/18 1423    09/04/18 1417  Transfer Patient  Once      09/04/18 1423    09/04/18 1406  Culture, Routine - Swab, Vulva      09/04/18  1406    09/04/18 1400  nicotine (NICODERM CQ) 21 MG/24HR patch 1 patch  Daily      09/04/18 1150    09/04/18 1400  sterile water irrigation solution  As Needed,   Status:  Discontinued      09/04/18 1400    09/04/18 1342  lactated ringers infusion  Continuous      09/04/18 1340    09/04/18 1340  POC Glucose Once  Once,   Status:  Canceled     Comments:  For all diabetic patients and all patients who are to be admitted. Notify Anesthesiologist for blood sugar > 180.      09/04/18 1340    09/04/18 1340  Insert Peripheral IV  Once,   Status:  Canceled      09/04/18 1340    09/04/18 1340  Saline Lock & Maintain IV Access  Continuous,   Status:  Canceled      09/04/18 1340    09/04/18 1340  May take Beta Blocker from home with sip of water.  Once,   Status:  Canceled      09/04/18 1340    09/04/18 1339  Vital Signs - Per Anesthesia Protocol  As Needed,   Status:  Canceled      09/04/18 1340    09/04/18 1339  Pulse Oximetry, Continuous  As Needed,   Status:  Canceled      09/04/18 1340    09/04/18 1339  sodium chloride 0.9 % flush 1-10 mL  As Needed,   Status:  Discontinued      09/04/18 1340    09/04/18 1258  POC Glucose Once  Once      09/04/18 1257    09/04/18 1244  Follow Anesthesia Guidelines / Standing Orders  Once      09/04/18 1244    09/04/18 1244  Obtain Informed Consent  Once      09/04/18 1244    09/04/18 1244  Insert Peripheral IV  Once,   Status:  Canceled      09/04/18 1244    09/04/18 1244  Saline Lock & Maintain IV Access  Continuous,   Status:  Canceled      09/04/18 1244    09/04/18 1244  sodium chloride 0.9 % flush 1-10 mL  As Needed,   Status:  Discontinued      09/04/18 1244    09/04/18 1244  Obtain Informed Consent  Once      09/04/18 1243    09/04/18 1234  Case Request  Once      09/04/18 1234    09/04/18 1229  NPO Diet  Diet Effective Now,   Status:  Canceled      09/04/18 1228    09/04/18 1227  scuds  Once      09/04/18 1226    09/04/18 1146  Do NOT Hold Basal Insulin When Patient is NPO, Hold  Bolus Dose if NPO  Continuous      09/04/18 1150 09/04/18 1146  Follow [facility] Hypoglycemia Standing Orders For Blood Glucose Less Than 70 mg/dL  Until Discontinued      09/04/18 1150 09/04/18 1146  Hypoglycemia Treatment - Alert Patient That is Not NPO and Can Safely Swallow  Until Discontinued     Comments:  Administer 4 oz Fruit Juice OR 4 oz Regular Soda OR 8 oz Milk OR 15-30 grams (1 tube) of Glucose Gel.  Recheck Blood Glucose 15 Minutes After Ingestion, Repeat Treatment & Continue to Recheck Blood Sugar Every 15 Minutes Until Blood Glucose is 70 mg/dL or Higher.  Once Blood Glucose is 70 mg/dL or Higher and if It Will Be more than 60 Minutes Until the Next Meal, Provide Appropriate Snack (Including Carbohydrate Food) Based on Meal Plan Order. Give Meal Tray As Soon As Possible.    09/04/18 1150 09/04/18 1146  Hypoglycemia Treatment - Patient Has IV Access - Unresponsive, NPO or Unable To Safely Swallow  Until Discontinued     Comments:  Administer 25g (50ml) D50W IV Push.  Recheck Blood Glucose 15 Minutes After Administration, if Blood Glucose Remains Less Than 70 mg/dl, Repeat Treatment   Recheck Blood Glucose 15 Minutes After 2nd Administration, if Blood Glucose Remains Less Than 70 mg/dL After 2nd Dose of D50W, Contact Provider for Further Treatment Orders & Consider Adding IVF With D5W for Maintenance    09/04/18 1150 09/04/18 1146  Hypoglycemia Treatment - Patient Without IV Access - Unresponsive, NPO or Unable To Safely Swallow  Until Discontinued     Comments:  Administer 1mg Glucagon SQ & Establish IV Access.  Turn Patient on Side - Nausea / Vomiting May Occur.  Recheck Blood Glucose 15 Minutes After Administration.  If Blood Glucose Remains Less Than 70, Administer 25g D50W IV Push (50ml).  Recheck Blood Glucose 15 Minutes After Administration of D50W, if Blood Glucose Remains Less Than 70 mg/dl, Contact Provider for Further Treatment Orders & Consider Adding IVF With D5 for Maintenance     09/04/18 1150    09/04/18 1146  Notify Provider of event. Communicate needs and document in EMR.  Once      09/04/18 1150    09/04/18 1146  Document event and patients response to treatment in EMR, any medications given to be documented on MAR.  Once      09/04/18 1150    09/04/18 1145  dextrose (GLUTOSE) oral gel 15 g  Every 15 Minutes PRN      09/04/18 1150    09/04/18 1145  dextrose (D50W) 25 g/ 50mL Intravenous Solution 25 g  Every 15 Minutes PRN      09/04/18 1150    09/04/18 1145  glucagon (human recombinant) (GLUCAGEN DIAGNOSTIC) injection 1 mg  As Needed      09/04/18 1150    09/04/18 1144  ondansetron (ZOFRAN) injection 4 mg  Every 6 Hours PRN      09/04/18 1150    09/04/18 1144  calcium carbonate (TUMS) chewable tablet 500 mg (200 mg elemental)  2 Times Daily PRN      09/04/18 1150    09/04/18 1142  Tobacco Cessation Education  Once      09/04/18 1150    09/04/18 1140  Place Sequential Compression Device  Once      09/04/18 1150    09/04/18 1140  Maintain Sequential Compression Device  Continuous      09/04/18 1150    09/04/18 1139  Code Status and Medical Interventions:  Continuous,   Status:  Canceled      09/04/18 1150    09/04/18 1139  Activity - Ad Gladys  Until Discontinued      09/04/18 1150    09/04/18 1139  Oxygen Therapy- Nasal Cannula; Titrate for SPO2: 90%  Continuous      09/04/18 1150    09/04/18 1139  Insert Peripheral IV  Once      09/04/18 1150    09/04/18 1139  Saline Lock & Maintain IV Access  Continuous,   Status:  Canceled      09/04/18 1150    09/04/18 1138  sodium chloride 0.9 % flush 1-10 mL  As Needed      09/04/18 1150    09/04/18 1136  NPO Diet  Diet Effective Now,   Status:  Canceled      09/04/18 1138    09/04/18 1132  HYDROmorphone (DILAUDID) injection 0.5 mg  Every 2 Hours PRN      09/04/18 1138    09/04/18 0934  IP General Consult (Use specialty-specific consult if known)  Once,   Status:  Canceled     Comments:  D/w Dr Clark   Provider:  Shannan Farfan DO    09/04/18 0938     09/04/18 0934  Inpatient Admission  Once      09/04/18 0935    09/04/18 0910  POC Glucose Once  Once      09/04/18 0909    09/04/18 0910  Osmolality, Calculated  Once      09/04/18 0909    09/04/18 0908  Urinalysis, Microscopic Only - Urine, Clean Catch  Once      09/04/18 0907    09/04/18 0829  Morphine sulfate (PF) injection 4 mg  Once      09/04/18 0827 09/04/18 0829  ondansetron (ZOFRAN) injection 4 mg  Once      09/04/18 0827 09/04/18 0828  sodium chloride 0.9 % bolus 1,000 mL  Once      09/04/18 0826 09/04/18 0828  piperacillin-tazobactam (ZOSYN) 3.375 g/100 mL 0.9% NS IVPB (mbp)  Once      09/04/18 0826 09/04/18 0828  vancomycin (VANCOCIN) 2,000 mg in sodium chloride 0.9 % 500 mL IVPB  Once      09/04/18 0826 09/04/18 0827  Comprehensive Metabolic Panel  Once      09/04/18 0826 09/04/18 0827  Pregnancy, Urine - Urine, Clean Catch  Once      09/04/18 0826    09/04/18 0827  Urinalysis With Culture If Indicated - Urine, Clean Catch  Once      09/04/18 0826 09/04/18 0827  Blood Culture - Blood,  Once      09/04/18 0826 09/04/18 0827  Blood Culture - Blood,  Once      09/04/18 0826 09/04/18 0827  Lactic Acid, Plasma  Once      09/04/18 0826 09/04/18 0827  Insert peripheral IV  Once      09/04/18 0826 09/04/18 0827  POC Glucose Once  Once      09/04/18 0826 09/04/18 0826  sodium chloride 0.9 % flush 10 mL  As Needed,   Status:  Discontinued      09/04/18 0826 09/04/18 0826  CBC & Differential  Once      09/04/18 0826 09/04/18 0826  CBC Auto Differential  PROCEDURE ONCE      09/04/18 0826    --  montelukast (SINGULAIR) 10 MG tablet  Nightly      09/04/18 1222    --  atorvastatin (LIPITOR) 40 MG tablet  Daily      09/04/18 1222    --  loratadine (CLARITIN) 10 MG tablet  Daily      09/04/18 1222    Pending  amLODIPine (NORVASC) tablet 5 mg  Nightly      Pending    Pending  aspirin EC tablet 81 mg  Daily      Pending    Pending  atorvastatin (LIPITOR) tablet 40 mg  Daily       Pending    Pending  cetirizine (zyrTEC) tablet 10 mg  Daily      Pending    Pending  losartan (COZAAR) 100 mg, hydrochlorothiazide (HYDRODIURIL) 25 mg for HYZAAR 100-25  Daily      Pending    Pending  glipiZIDE (GLUCOTROL) tablet 5 mg  Every Morning Before Breakfast      Pending    Pending  montelukast (SINGULAIR) tablet 10 mg  Nightly      Pending    Pending  pantoprazole (PROTONIX) EC tablet 40 mg  Every Morning      Pending             Operative/Procedure Notes (all)      Shannan Farfan DO at 9/4/2018  2:24 PM  Version 1 of 1         PERINEAL ABSCESS INCISION AND DRAINAGE  Procedure Note    Shahrzad Sol  9/4/2018    Pre-op Diagnosis:   Abscess, vulva [N76.4]    Post-op Diagnosis:     Post-Op Diagnosis Codes:     * Abscess, vulva [N76.4]    Procedure(s):  INCISION AND DRAINAGE OF RIGHT VULVA ABSCESS     Surgeon(s):  Shannan Farfan DO Gilbert, Travis Daniel, DO    Anesthesia: MAC    Staff:   Circulator: Meg Olmedo RN  Scrub Person: Rachel Resendez LPN; Michelle Sarah    Estimated Blood Loss: minimal    Specimens:                  Order Name Source Comment Collection Info Order Time   CULTURE, ROUTINE Vulva  Collected By: Shannan Farfan DO 9/4/2018  2:06 PM         Procedure     The patient was prepped and draped in normal sterile fashion in the dorsal lithotomy position with careful placement of the lower extremity in Yellow fin stirrups. The abscess was palpated in the upper right labia majora.  A 1 cm stab incision was made with an 11 blade scalpel into the cyst wall.  Pus was expressed and cultured.  The cyst cavity was explored with mosquito hemostats to break up any loculations and irrigated with normal saline. It was then packed with 1/4 inch wet gauze.  All sponge, lap and needle counts were correct.  The patient was taken to the recovery room in stable condition.      Shannan Farfan DO     Date: 9/4/2018  Time: 2:24 PM        Electronically signed by Shannan Farfan DO at  9/4/2018  2:35 PM       Physician Progress Notes (all)     No notes of this type exist for this encounter.

## 2018-09-05 NOTE — PLAN OF CARE
Problem: Patient Care Overview  Goal: Plan of Care Review  Outcome: Ongoing (interventions implemented as appropriate)   09/04/18 1448 09/04/18 1930   Coping/Psychosocial   Plan of Care Reviewed With --  patient   OTHER   Outcome Summary PROGRESSING TOWARD GOALS. --      Goal: Individualization and Mutuality  Outcome: Ongoing (interventions implemented as appropriate)      Problem: Skin and Soft Tissue Infection (Adult)  Goal: Signs and Symptoms of Listed Potential Problems Will be Absent, Minimized or Managed (Skin and Soft Tissue Infection)  Outcome: Ongoing (interventions implemented as appropriate)   09/04/18 1229   Goal/Outcome Evaluation   Problems Assessed (Skin and Soft Tissue Infection) all   Problems Present (Skin/Soft Tissue Inf) pain;infection progression

## 2018-09-06 LAB
BACTERIA SPEC AEROBE CULT: NO GROWTH
GRAM STN SPEC: NORMAL

## 2018-09-06 NOTE — ANESTHESIA POSTPROCEDURE EVALUATION
Patient: Shahrzad Horton    Procedure Summary     Date:  09/04/18 Room / Location:  [facility] OR 01 /  [facility] OR    Anesthesia Start:  1349 Anesthesia Stop:  1418    Procedure:  INCISION AND DRAINAGE OF RIGHT VULVA ABSCESS (Right Perineum) Diagnosis:       Abscess, vulva      (Abscess, vulva [N76.4])    Surgeon:  Shannan Farfan DO Provider:  Andrew Rodriguez DO    Anesthesia Type:  general ASA Status:  3          Anesthesia Type: general  Last vitals  BP   102/54 (09/05/18 0715)   Temp   98.3 °F (36.8 °C) (09/05/18 0715)   Pulse   78 (09/05/18 0715)   Resp   18 (09/05/18 0715)     SpO2   97 % (09/05/18 0715)     Post Anesthesia Care and Evaluation    Patient location during evaluation: PHASE II  Patient participation: complete - patient participated  Level of consciousness: awake and alert  Pain score: 1  Pain management: adequate  Airway patency: patent  Anesthetic complications: No anesthetic complications  PONV Status: controlled  Cardiovascular status: acceptable  Respiratory status: acceptable  Hydration status: acceptable

## 2018-09-06 NOTE — PAYOR COMM NOTE
"CONTACT:  ANDRESSA EVANS RN, BSN  UTILIZATION MANAGEMENT DEPT.  [facility]  [address], [address]  PHONE:  [phone]  FAX: [phone]    PATIENT DISCHARGED TO HOME ON 9/5/18. AWAITING AUTHORIZATION DETERMINATION.    Carlos Bergman  (43 y.o. Female)     Date of Birth Social Security Number Address Home Phone MRN    1975  [address]   [place] [place] [facility] [address] [phone] [id number]    [employer]ian Marital Status          Other        Admission Date Admission Type Admitting Provider Attending Provider Department, Room/Bed    9/4/18 Emergency Shannan Farfan DO  [facility], W234/1    Discharge Date Discharge Disposition Discharge Destination        9/5/2018 Home or Self Care Home             Attending Provider:  (none)   Allergies:  No Known Allergies    Isolation:  None   Infection:  None   Code Status:  Prior    Ht:  175.3 cm (69\")   Wt:  127 kg (280 lb)    Admission Cmt:  None   Principal Problem:  Abscess, vulva [N76.4] More...                 Active Insurance as of 9/4/2018     Primary Coverage     Payor Plan Insurance Group Employer/Plan Group    [employer] [place] [employer] [place]ICA[place]      Payor Plan Address Payor Plan Phone Number Effective From Effective To    [address] [address] [phone] 7/3/2017     [facility] [id number]       Subscriber Name Subscriber Birth Date Member ID       CARLOS BERGMAN 1975 [id number]                 Emergency Contacts      (Rel.) Home Phone Work Phone Mobile Phone    Zainab Saravia (Mother) [phone]    Nicholas Bergman (Spouse) [phone] -- --               Discharge Summary      Shannan Farfan DO at 9/5/2018 10:19 AM        Discharge Summary    Admit Date: 9/4/2018  8:12 AM    Admit Diagnosis: Abscess of vulva [N76.4]    Date of Discharge:  9/5/2018    Discharge Diagnosis: Same        Hospital Course  Patient is a 43 y.o. female admitted secondary to Vulvar abscess. Patient " doing better after I & D. Symptoms have improved. Patient tolerating a regular diet and ambulating without difficulty. She has been afebrile after one dose Vancomycin and now oral Bactrim DS. Will discharge to home today.         Vital Signs  Temp:  [97.2 °F (36.2 °C)-98.5 °F (36.9 °C)] 98.3 °F (36.8 °C)  Heart Rate:  [69-90] 78  Resp:  [13-22] 18  BP: (102-161)/(53-86) 102/54    Review of Systems    The following systems were reviewed and negative;  ENT, respiratory, cardiovascular, gastrointestinal, genitourinary, breast, endocrine and allergies / immunologic.    Physical Examination: General appearance - alert, well appearing, and in no distress  Pelvic - VULVA: normal appearing vulva with no masses, tenderness or lesions, vulvar edema , no erythema.  Incision is C/D/I.  Packing removed and new packing placed.        Condition on Discharge:  Stable    Discharge Diet: ADA    Discharge planning:  Wound care instructions reviewed.  She is to change packing twice daily.  Importance of blood sugar control with wound healing reviewed.  F/U in office Friday or sooner if needed.  Awaiting culture results. Smoking cessation addressed but she declines to quit.  Resume all home medications.     Follow-up Appointment  Patient will follow up in clinic this week.        Shannan Farfan DO  09/05/18  10:20 AM      Electronically signed by Shannan Farfan DO at 9/5/2018 10:27 AM

## 2018-09-09 LAB
BACTERIA SPEC AEROBE CULT: NORMAL
BACTERIA SPEC AEROBE CULT: NORMAL

## 2020-12-18 ENCOUNTER — APPOINTMENT (OUTPATIENT)
Dept: MAMMOGRAPHY | Facility: HOSPITAL | Age: 45
End: 2020-12-18

## 2023-12-12 ENCOUNTER — TELEPHONE (OUTPATIENT)
Dept: SURGERY | Facility: CLINIC | Age: 48
End: 2023-12-12
Payer: COMMERCIAL

## 2024-03-29 ENCOUNTER — OFFICE VISIT (OUTPATIENT)
Dept: SURGERY | Facility: CLINIC | Age: 49
End: 2024-03-29
Payer: COMMERCIAL

## 2024-03-29 VITALS
DIASTOLIC BLOOD PRESSURE: 68 MMHG | BODY MASS INDEX: 42.21 KG/M2 | HEIGHT: 69 IN | WEIGHT: 285 LBS | SYSTOLIC BLOOD PRESSURE: 122 MMHG

## 2024-03-29 DIAGNOSIS — E66.01 MORBID OBESITY: Primary | ICD-10-CM

## 2024-03-29 DIAGNOSIS — Z72.0 TOBACCO ABUSE: ICD-10-CM

## 2024-03-29 DIAGNOSIS — Z01.818 PREOP TESTING: ICD-10-CM

## 2024-03-29 RX ORDER — CETIRIZINE HYDROCHLORIDE 10 MG/1
TABLET ORAL
COMMUNITY
Start: 2024-03-19

## 2024-03-29 RX ORDER — SITAGLIPTIN 100 MG/1
TABLET, FILM COATED ORAL
COMMUNITY
Start: 2024-03-19

## 2024-03-29 RX ORDER — DAPAGLIFLOZIN 10 MG/1
1 TABLET, FILM COATED ORAL EVERY MORNING
COMMUNITY
Start: 2024-03-19

## 2024-03-29 RX ORDER — AMITRIPTYLINE HYDROCHLORIDE 50 MG/1
50 TABLET, FILM COATED ORAL NIGHTLY
COMMUNITY

## 2024-03-29 RX ORDER — INSULIN GLARGINE 100 [IU]/ML
INJECTION, SOLUTION SUBCUTANEOUS
COMMUNITY
Start: 2024-03-19

## 2024-03-29 RX ORDER — METOPROLOL SUCCINATE 25 MG/1
25 TABLET, EXTENDED RELEASE ORAL DAILY
COMMUNITY

## 2024-03-29 NOTE — PROGRESS NOTES
Subjective   Shahrzad Horton is a 48 y.o. female who presents today for Initial Evaluation    Chief Complaint:    Chief Complaint   Patient presents with    baratric patient        History of Present Illness:    History of Present Illness Shahrzad is a morbidly obese 48-year-old female who presents for an evaluation for bariatric surgery.  She has a past medical history significant for diabetes, hyperlipidemia, asthma, GERD, and hypertension.  Patient states that her last A1c was approximately 9.  Previously 11 prior to that.    Diet: Patient states for breakfast she typically has a coffee with store-bought creamer.  Also sometimes she may have a bagel with either peanut butter or jelly.  She states that she does not always eat breakfast.  For lunch she typically has ham and crackers or cheese and crackers.  Reports her biggest meal of the day is dinner.  States that she has a low income so typically her meals include hamburger helper.  She tries not to eat Posta.  States that she enjoys salads.  When she is cooking for her stone she typically tries to break her meals rather than sanford them.  She states that she also likes chicken and fish.  She does not drink soda daily.  She recently stopped drinking Mountain Dew.  She states that every once in a while she will have a coffee cup of soda and sip on it.    For exercise patient states that she is not in a formal exercise program.  However, she gardens and does a lot of yard work.  She states she is outside quite a bit.  Patient reports that her lower back and hip causes her pain although she likes to walk.  States that cold weather worsens her hip and back pain.  She does report that she has dyspnea upon exertion.    She has tried the weight loss injections in the past.  Unable to tolerate them.  Currently she is on insulin injections.    She does currently smoke.  She is interested in quitting.  Patient reports that she has tried medications to quit smoking in the past.   Mentioned using a vape.    The following portions of the patient's history were reviewed and updated as appropriate: allergies, current medications, past family history, past medical history, past social history, past surgical history and problem list.    Past Medical History:  Past Medical History:   Diagnosis Date    Abnormal Pap smear of cervix     Abnormal vaginal bleeding     Arthritis     Asthma     Cervical cancer 2000    Had cryo surgery and part of cervix removed    Diabetes mellitus     Takes oral medication    Hyperlipidemia     Hypertension     Migraine     Rh incompatibility     Substance abuse     Smaoke Giulianoania    TIA (transient ischemic attack) 2012    Urogenital trichomoniasis 2010    Was tx'd       Social History:  Social History     Socioeconomic History    Marital status:    Tobacco Use    Smoking status: Every Day     Current packs/day: 0.50     Average packs/day: 0.5 packs/day for 38.6 years (19.3 ttl pk-yrs)     Types: Cigarettes     Start date: 9/4/1985     Passive exposure: Never    Smokeless tobacco: Never   Vaping Use    Vaping status: Never Used   Substance and Sexual Activity    Alcohol use: Yes     Alcohol/week: 2.0 standard drinks of alcohol     Types: 2 Cans of beer per week     Comment: ocas    Drug use: Yes     Frequency: 3.0 times per week     Types: Marijuana     Comment: 11/14/16    Sexual activity: Defer       Family History:  Family History   Problem Relation Age of Onset    Breast cancer Maternal Grandmother     Breast cancer Maternal Aunt        Past Surgical History:  Past Surgical History:   Procedure Laterality Date    CERVICAL BIOPSY      CHOLECYSTECTOMY      DILATATION AND CURETTAGE      ENDOMETRIAL ABLATION      GYNECOLOGIC CRYOSURGERY      HYSTEROSCOPY N/A 11/16/2016    Procedure: HYSTEROSCOPY, D&C,  ENDOMETRIAL ABLATION;  Surgeon: Vikram Clark DO;  Location: [facility];  Service:     PERINEAL ABSCESS INCISION AND DRAINAGE Right 9/4/2018     Procedure: INCISION AND DRAINAGE OF RIGHT VULVA ABSCESS;  Surgeon: Shannan Farfan DO;  Location:  [facility];  Service: Obstetrics/Gynecology    TUBAL ABDOMINAL LIGATION      TUBAL FILSHIE CLIPPING LAPAROSCOPIC WITH ENDOMETRIAL ABLATION N/A 11/16/2016    Procedure: TUBAL FILSHIE CLIPPING LAPAROSCOPIC;  Surgeon: Vikram Clark DO;  Location:  [facility];  Service:        Problem List:  Patient Active Problem List   Diagnosis    Postoperative abscess       Allergy:   No Known Allergies     Current Medications:   Current Outpatient Medications   Medication Sig Dispense Refill    amitriptyline (ELAVIL) 50 MG tablet Take 1 tablet by mouth Every Night.      aspirin 81 MG EC tablet Take 1 tablet by mouth Daily.      atorvastatin (LIPITOR) 40 MG tablet Take 1 tablet by mouth Daily.      cetirizine (zyrTEC) 10 MG tablet TAKE ONE TABLET BY MOUTH EVERY DAY AS NEEDED FOR ALLERGIES      Farxiga 10 MG tablet Take 10 mg by mouth Every Morning. for diabetes.      glipiZIDE (GLUCOTROL) 5 MG tablet Take 1 tablet by mouth Daily.      Januvia 100 MG tablet TAKE ONE TABLET BY MOUTH EVERY DAY FOR DIABETES      Lantus SoloStar 100 UNIT/ML injection pen INJECT 90 UNITS SUBCUTANEOUSLY EVERY DAY, THEN INCREASE BY 2 UNITS EVERY 3 DAYS UNTIL REACH MORNING GOAL *MAX DAILY DOSE 100 UNITS      losartan-hydrochlorothiazide (HYZAAR) 100-25 MG per tablet Take 1 tablet by mouth Daily.      metoprolol succinate XL (TOPROL-XL) 25 MG 24 hr tablet Take 1 tablet by mouth Daily.      montelukast (SINGULAIR) 10 MG tablet Take 1 tablet by mouth Every Night.      omeprazole (priLOSEC) 20 MG capsule Take 1 capsule by mouth Daily.       No current facility-administered medications for this visit.       Review of Systems:    Review of Systems   Constitutional:  Negative for activity change.   HENT:  Negative for congestion.    Eyes:  Negative for blurred vision.   Respiratory:  Positive for shortness of breath (Dyspnea on exertion).    Cardiovascular:   "Negative for chest pain.   Gastrointestinal:  Negative for abdominal pain.   Endocrine: Negative for cold intolerance.   Genitourinary:  Negative for flank pain.   Musculoskeletal:  Positive for arthralgias (Hip pain) and back pain.   Skin:  Negative for bruise.   Allergic/Immunologic: Negative for environmental allergies.   Neurological:  Negative for confusion.   Hematological:  Negative for adenopathy.   Psychiatric/Behavioral:  Negative for agitation.          Physical Exam:   Physical Exam  Constitutional:       Appearance: Normal appearance. She is obese.   HENT:      Head: Normocephalic and atraumatic.      Right Ear: External ear normal.      Left Ear: External ear normal.   Eyes:      Conjunctiva/sclera: Conjunctivae normal.      Pupils: Pupils are equal, round, and reactive to light.   Cardiovascular:      Pulses: Normal pulses.   Pulmonary:      Effort: Pulmonary effort is normal.   Abdominal:      General: Abdomen is flat.      Palpations: Abdomen is soft.   Musculoskeletal:         General: Normal range of motion.      Cervical back: Normal range of motion and neck supple.   Skin:     General: Skin is warm and dry.      Capillary Refill: Capillary refill takes less than 2 seconds.   Neurological:      General: No focal deficit present.      Mental Status: She is alert and oriented to person, place, and time.   Psychiatric:         Mood and Affect: Mood normal.         Behavior: Behavior normal.         Vitals:  Blood pressure 122/68, height 175.3 cm (69.02\"), weight 129 kg (285 lb), not currently breastfeeding.   Body mass index is 42.07 kg/m².   Ideal body weight: 146 pounds  Excess body weight: 139 pounds  Goal weight: 271 pounds with 14 pounds weight loss      Assessment & Plan   Diagnoses and all orders for this visit:    1. Morbid obesity (Primary)  -     FL upper gi w air contrast; Future  -     Ambulatory Referral to Disease State Management    2. BMI 40.0-44.9, adult  -     FL upper gi w air " contrast; Future  -     Ambulatory Referral to Disease State Management    3. Tobacco abuse  -     Ambulatory Referral to Disease State Management    4. Preop testing  -     Ambulatory Referral to Psychiatry      Shahrzad is a 48-year-old morbidly obese female who presents with a past medical history significant for diabetes, hyperlipidemia, asthma, GERD and hypertension.    She will have a UGI performed to rule out a hiatal hernia.  Verbalized understanding that if she has a large hiatal hernia she will have to be referred to [facility].    She will be referred to nutrition for nutritional support throughout this process.  She will also be referred to psychiatry for a psychiatric evaluation.    She does currently smoke, patient will be referred to the smoking cessation program at our facility.  We did discuss the need to quit smoking by surgery date regardless if it is cigarettes versus e-cigarettes.    Weight loss goal is 271 pounds with 14 pounds of weight loss.  Verbalized understanding.    She will follow-up in 4 weeks.    Visit Diagnoses:    ICD-10-CM ICD-9-CM   1. Morbid obesity  E66.01 278.01   2. BMI 40.0-44.9, adult  Z68.41 V85.41   3. Tobacco abuse  Z72.0 305.1   4. Preop testing  Z01.818 V72.84         MEDS ORDERED DURING VISIT:  No orders of the defined types were placed in this encounter.      Return in about 4 weeks (around 4/26/2024).             This document has been electronically signed by HARITHA Arteaga  March 29, 2024 14:26 EDT    Please note that portions of this note were completed with a voice recognition program.

## 2024-04-08 ENCOUNTER — DISEASE STATE MANAGEMENT VISIT (OUTPATIENT)
Dept: PHARMACY | Facility: HOSPITAL | Age: 49
End: 2024-04-08
Payer: COMMERCIAL

## 2024-04-08 DIAGNOSIS — Z72.0 TOBACCO USE: Primary | ICD-10-CM

## 2024-04-08 RX ORDER — NICOTINE 21 MG/24HR
1 PATCH, TRANSDERMAL 24 HOURS TRANSDERMAL EVERY 24 HOURS
Qty: 28 PATCH | Refills: 0 | Status: SHIPPED | OUTPATIENT
Start: 2024-04-08

## 2024-04-08 NOTE — PROGRESS NOTES
Tobacco Cessation Pharmacy Note    Years of tobacco use: 35 years - cigarettes  Average number of cigarettes or other tobacco/nicotine products per day: 1-1.5 PPD (25-30 cigarettes/day)  Number of Previous Quit Attempts: 2  Previous tobacco cessation medication attempts, failures, intolerances: 2 unsuccessful attempts - Chantix (seizures), nicotine gum  Other recreational substance use: THC  1st cigarette of the day smoked: within 5 minutes of waking    Patient presents to our clinic today for tobacco cessation visit. She reports she typically smokes 1 - 1½ packs of cigarettes per day (25-30 cigarettes per day). She has had two smoking cessation attempts in the past: one attempt with chantix and one with nicotine gum. She reports both attempts were unsuccessful. Of note, she had to discontinue Chantix as she started having seizures. She reports nicotine gum worked well to control her cravings, but she self-discontinued it and started smoking again.     Patient reports she is planning to have bariatric surgery in the fall of 2024, and her surgeon recommended smoking cessation. She has attempted to cut back on her nicotine usage by switching from cigarettes to a vape over the weekend. Patient reports since switching to a vape it has help with her oral/tangible fixation. She states her biggest struggle is cutting out smoking a cigarette first thing when she wakes up and after her meal at supper.      Past Medical History:   Diagnosis Date    Abnormal Pap smear of cervix     Abnormal vaginal bleeding     Arthritis     Asthma     Cervical cancer 2000    Had cryo surgery and part of cervix removed    Diabetes mellitus     Takes oral medication    Hyperlipidemia     Hypertension     Migraine     Rh incompatibility     Substance abuse     Smaoke Kisha    TIA (transient ischemic attack) 2012    Urogenital trichomoniasis 2010    Was tx'd     No Known Allergies  Current Outpatient Medications   Medication Sig Dispense  Refill    amitriptyline (ELAVIL) 50 MG tablet Take 1 tablet by mouth Every Night.      aspirin 81 MG EC tablet Take 1 tablet by mouth Daily.      atorvastatin (LIPITOR) 40 MG tablet Take 1 tablet by mouth Daily.      cetirizine (zyrTEC) 10 MG tablet TAKE ONE TABLET BY MOUTH EVERY DAY AS NEEDED FOR ALLERGIES      Farxiga 10 MG tablet Take 10 mg by mouth Every Morning. for diabetes.      glipiZIDE (GLUCOTROL) 5 MG tablet Take 1 tablet by mouth Daily.      Januvia 100 MG tablet TAKE ONE TABLET BY MOUTH EVERY DAY FOR DIABETES      Lantus SoloStar 100 UNIT/ML injection pen INJECT 90 UNITS SUBCUTANEOUSLY EVERY DAY, THEN INCREASE BY 2 UNITS EVERY 3 DAYS UNTIL REACH MORNING GOAL *MAX DAILY DOSE 100 UNITS      losartan-hydrochlorothiazide (HYZAAR) 100-25 MG per tablet Take 1 tablet by mouth Daily.      metoprolol succinate XL (TOPROL-XL) 25 MG 24 hr tablet Take 1 tablet by mouth Daily.      montelukast (SINGULAIR) 10 MG tablet Take 1 tablet by mouth Every Night.      omeprazole (priLOSEC) 20 MG capsule Take 1 capsule by mouth Daily.       No current facility-administered medications for this visit.         Assessment:     Readiness to quit:    Stage 1: Not ready to quit in the next month  Stage 2: Ready to quit in the next month (as of 4/8/24)  Stage 3: Recent quitter, quit within the past 6 months   Stage 4: Former tobacco user, quit > 6 months ago      Plan:     Quit Date Planned: 4/29/24.   Behavioral options for quitting reviewed.  Provided additional resources to help with tobacco cessation. Provided patient with tobacco usage log and s/sx of withdrawal handout.  Medication options reviewed.  Risks, benefits, and side effects discussed and questions answered.   Education was provided regarding: motivation to cease tobacco use, drug information on the medication dispensed (including directions for use and adverse effects), nicotine withdrawal symptoms, lifestyle modifications and techniques to prevent relapse.   Pursuant  to the [place] Board of Pharmacy Approved Protocol,  will order:   Nicotine Patches 21 mg/24 hr   Nicotine Gum 4 mg  Will notify the patient's PCP - HARITHA Gomez.  Patient aware not to use NRT while actively smoking. Patient to contact our clinic to reschedule follow up if she decides to quit sooner than planned quit date and begins using NRT prior to 4/29/24.  Will follow up with patient in 6 weeks      Alondra Lawler RPH  04/08/24  08:55 EDT

## 2024-04-26 ENCOUNTER — OFFICE VISIT (OUTPATIENT)
Dept: SURGERY | Facility: CLINIC | Age: 49
End: 2024-04-26
Payer: COMMERCIAL

## 2024-04-26 VITALS — WEIGHT: 284.8 LBS | BODY MASS INDEX: 42.18 KG/M2 | HEIGHT: 69 IN

## 2024-04-26 DIAGNOSIS — E66.01 MORBID OBESITY: ICD-10-CM

## 2024-04-26 NOTE — H&P (VIEW-ONLY)
Subjective   Shahrzad Horton is a 48 y.o. female who presents today for Initial Evaluation    Chief Complaint:    Chief Complaint   Patient presents with    baratric patient        History of Present Illness:    History of Present Illness Shahrzad is a morbidly obese 48-year-old female who presents for her second bariatric visit.  She has a past medical history significant for diabetes, hyperlipidemia, asthma, GERD and hypertension.  She reports that she has been doing good since last visit.  States that she has yet to quit smoking, however, she has met with the smoking cessation program at our facility.  She is currently on nicotine patches and a vape.  She reports her quit date for cigarettes is Monday.  She states that she has cut back and only had approximately 1 pack over the last week.  Patient also reports in terms of diet she has cut back on some carbs.  She has not had her psychiatric evaluation scheduled yet.  Also has not had UGI scheduled.  Referrals states that they were unable to reach patient.  She will meet with nutrition today.    The following portions of the patient's history were reviewed and updated as appropriate: allergies, current medications, past family history, past medical history, past social history, past surgical history and problem list.    Past Medical History:  Past Medical History:   Diagnosis Date    Abnormal Pap smear of cervix     Abnormal vaginal bleeding     Arthritis     Asthma     Cervical cancer 2000    Had cryo surgery and part of cervix removed    Diabetes mellitus     Takes oral medication    Hyperlipidemia     Hypertension     Migraine     Rh incompatibility     Substance abuse     Nancy Beard    TIA (transient ischemic attack) 2012    Urogenital trichomoniasis 2010    Was tx'd       Social History:  Social History     Socioeconomic History    Marital status:    Tobacco Use    Smoking status: Every Day     Current packs/day: 1.50     Average packs/day: 1.4  packs/day for 38.6 years (54.0 ttl pk-yrs)     Types: Cigarettes     Start date: 9/4/1985     Passive exposure: Never    Smokeless tobacco: Never   Vaping Use    Vaping status: Never Used   Substance and Sexual Activity    Alcohol use: Yes     Alcohol/week: 2.0 standard drinks of alcohol     Types: 2 Cans of beer per week     Comment: ocas    Drug use: Yes     Frequency: 3.0 times per week     Types: Marijuana     Comment: 11/14/16    Sexual activity: Defer       Family History:  Family History   Problem Relation Age of Onset    Breast cancer Maternal Grandmother     Breast cancer Maternal Aunt        Past Surgical History:  Past Surgical History:   Procedure Laterality Date    CERVICAL BIOPSY      CHOLECYSTECTOMY      DILATATION AND CURETTAGE      ENDOMETRIAL ABLATION      GYNECOLOGIC CRYOSURGERY      HYSTEROSCOPY N/A 11/16/2016    Procedure: HYSTEROSCOPY, D&C,  ENDOMETRIAL ABLATION;  Surgeon: Vikram Clark DO;  Location: [facility];  Service:     PERINEAL ABSCESS INCISION AND DRAINAGE Right 9/4/2018    Procedure: INCISION AND DRAINAGE OF RIGHT VULVA ABSCESS;  Surgeon: Shannan Farfan DO;  Location: [facility] OR;  Service: Obstetrics/Gynecology    TUBAL ABDOMINAL LIGATION      TUBAL FILSHIE CLIPPING LAPAROSCOPIC WITH ENDOMETRIAL ABLATION N/A 11/16/2016    Procedure: TUBAL FILSHIE CLIPPING LAPAROSCOPIC;  Surgeon: Vikram Clark DO;  Location: [facility];  Service:        Problem List:  Patient Active Problem List   Diagnosis    Postoperative abscess       Allergy:   No Known Allergies     Current Medications:   Current Outpatient Medications   Medication Sig Dispense Refill    amitriptyline (ELAVIL) 50 MG tablet Take 1 tablet by mouth Every Night. ½ tablet Sun-Thurs, full tablet on Fri-Sat      aspirin 81 MG EC tablet Take 1 tablet by mouth Daily.      atorvastatin (LIPITOR) 40 MG tablet Take 1 tablet by mouth Daily.      cetirizine (zyrTEC) 10 MG tablet Take 1 tablet by mouth Daily.      Farxiga 10 MG  "tablet Take 10 mg by mouth Every Morning. for diabetes.      glipiZIDE (GLUCOTROL) 5 MG tablet Take 2 tablets by mouth 2 (Two) Times a Day Before Meals.      Januvia 100 MG tablet TAKE ONE TABLET BY MOUTH EVERY DAY FOR DIABETES      Lantus SoloStar 100 UNIT/ML injection pen 95 Units Every Night.      losartan-hydrochlorothiazide (HYZAAR) 100-25 MG per tablet Take 1 tablet by mouth Daily.      metoprolol succinate XL (TOPROL-XL) 25 MG 24 hr tablet Take 1 tablet by mouth Daily.      montelukast (SINGULAIR) 10 MG tablet Take 1 tablet by mouth Every Night.      nicotine (NICODERM CQ) 21 MG/24HR patch Place 1 patch on clean, dry, hairless skin as directed by provider daily rotating sites. Remove old patch prior to applying new patch. May remove at bedtime if needed to prevent insomnia. Do Not Use Other Nicotine Products. 28 patch 0    nicotine polacrilex (NICORETTE) 4 MG gum Chew 1 piece Every 1 hour as needed for smoking cessation. Maximum 24 pieces in 24 hours. Gum should be chewed until it tingles, then \"park\" between gums & cheek. When tingling stops, chew again until tingling returns & once again \"park\" Between Gums & Cheek. Repeat until tingling is gone, then discard. Do not use with other nicotine products. 240 each 0    omeprazole (priLOSEC) 20 MG capsule Take 1 capsule by mouth Daily.       No current facility-administered medications for this visit.       Review of Systems:    Review of Systems   Respiratory:  Positive for shortness of breath.    Musculoskeletal:  Positive for arthralgias and back pain.         Physical Exam:   Physical Exam  Constitutional:       Appearance: Normal appearance. She is obese.   HENT:      Head: Normocephalic and atraumatic.      Right Ear: External ear normal.      Left Ear: External ear normal.   Eyes:      Conjunctiva/sclera: Conjunctivae normal.      Pupils: Pupils are equal, round, and reactive to light.   Cardiovascular:      Pulses: Normal pulses.   Pulmonary:      Effort: " "Pulmonary effort is normal.   Abdominal:      General: Abdomen is flat.   Musculoskeletal:         General: Normal range of motion.      Cervical back: Normal range of motion and neck supple.   Skin:     General: Skin is warm and dry.      Capillary Refill: Capillary refill takes less than 2 seconds.   Neurological:      General: No focal deficit present.      Mental Status: She is alert and oriented to person, place, and time.   Psychiatric:         Mood and Affect: Mood normal.         Behavior: Behavior normal.         Vitals:  Height 175.3 cm (69.02\"), weight 129 kg (284 lb 12.8 oz), not currently breastfeeding.   Body mass index is 42.04 kg/m².    Ideal body weight: 146 pounds  Excess body weight: 139 pounds  Goal weight: 271 pounds with 14 pounds weight loss     Assessment & Plan   Diagnoses and all orders for this visit:    1. BMI 40.0-44.9, adult (Primary)  -     Case Request; Standing  -     Case Request    2. Morbid obesity  -     Case Request; Standing  -     Case Request    Other orders  -     Follow Anesthesia Guidelines / Protocol; Future  -     Follow Anesthesia Guidelines / Protocol; Standing  -     Verify / Perform Chlorhexidine Skin Prep; Standing  -     Verify / Perform Chlorhexidine Skin Prep if Indicated (If Not Already Completed); Standing  -     Obtain Informed Consent; Future  -     Provide NPO Instructions to Patient; Future  -     Chlorhexidine Skin Prep; Future  -     Place Sequential Compression Device; Standing  -     Maintain Sequential Compression Device; Standing        Shahrzad is a 48-year-old morbidly obese female who presents with a past medical history significant for diabetes, hyperlipidemia, asthma, GERD and hypertension.     She will meet with nutrition today.  She will follow-up with him in 4 weeks.    She will have UGI and psychiatric evaluation scheduled.  Patient will also undergo EGD with Dr. Rudd.  Verbalized understanding prep instructions and procedure wished to " proceed.    Verbalized understanding weight loss goal being 271 pounds.  She will follow-up in 4 weeks.      Visit Diagnoses:    ICD-10-CM ICD-9-CM   1. BMI 40.0-44.9, adult  Z68.41 V85.41   2. Morbid obesity  E66.01 278.01         MEDS ORDERED DURING VISIT:  No orders of the defined types were placed in this encounter.      No follow-ups on file.             This document has been electronically signed by HARITHA Arteaga  April 26, 2024 13:33 EDT    Please note that portions of this note were completed with a voice recognition program.

## 2024-04-26 NOTE — PROGRESS NOTES
Subjective   Shahrzad Horton is a 48 y.o. female who presents today for Initial Evaluation    Chief Complaint:    Chief Complaint   Patient presents with    baratric patient        History of Present Illness:    History of Present Illness Shahrzad is a morbidly obese 48-year-old female who presents for her second bariatric visit.  She has a past medical history significant for diabetes, hyperlipidemia, asthma, GERD and hypertension.  She reports that she has been doing good since last visit.  States that she has yet to quit smoking, however, she has met with the smoking cessation program at our facility.  She is currently on nicotine patches and a vape.  She reports her quit date for cigarettes is Monday.  She states that she has cut back and only had approximately 1 pack over the last week.  Patient also reports in terms of diet she has cut back on some carbs.  She has not had her psychiatric evaluation scheduled yet.  Also has not had UGI scheduled.  Referrals states that they were unable to reach patient.  She will meet with nutrition today.    The following portions of the patient's history were reviewed and updated as appropriate: allergies, current medications, past family history, past medical history, past social history, past surgical history and problem list.    Past Medical History:  Past Medical History:   Diagnosis Date    Abnormal Pap smear of cervix     Abnormal vaginal bleeding     Arthritis     Asthma     Cervical cancer 2000    Had cryo surgery and part of cervix removed    Diabetes mellitus     Takes oral medication    Hyperlipidemia     Hypertension     Migraine     Rh incompatibility     Substance abuse     Nancy Beard    TIA (transient ischemic attack) 2012    Urogenital trichomoniasis 2010    Was tx'd       Social History:  Social History     Socioeconomic History    Marital status:    Tobacco Use    Smoking status: Every Day     Current packs/day: 1.50     Average packs/day: 1.4  packs/day for 38.6 years (54.0 ttl pk-yrs)     Types: Cigarettes     Start date: 9/4/1985     Passive exposure: Never    Smokeless tobacco: Never   Vaping Use    Vaping status: Never Used   Substance and Sexual Activity    Alcohol use: Yes     Alcohol/week: 2.0 standard drinks of alcohol     Types: 2 Cans of beer per week     Comment: ocas    Drug use: Yes     Frequency: 3.0 times per week     Types: Marijuana     Comment: 11/14/16    Sexual activity: Defer       Family History:  Family History   Problem Relation Age of Onset    Breast cancer Maternal Grandmother     Breast cancer Maternal Aunt        Past Surgical History:  Past Surgical History:   Procedure Laterality Date    CERVICAL BIOPSY      CHOLECYSTECTOMY      DILATATION AND CURETTAGE      ENDOMETRIAL ABLATION      GYNECOLOGIC CRYOSURGERY      HYSTEROSCOPY N/A 11/16/2016    Procedure: HYSTEROSCOPY, D&C,  ENDOMETRIAL ABLATION;  Surgeon: Vikram Clark DO;  Location: [facility];  Service:     PERINEAL ABSCESS INCISION AND DRAINAGE Right 9/4/2018    Procedure: INCISION AND DRAINAGE OF RIGHT VULVA ABSCESS;  Surgeon: Shannan Farfan DO;  Location: [facility] OR;  Service: Obstetrics/Gynecology    TUBAL ABDOMINAL LIGATION      TUBAL FILSHIE CLIPPING LAPAROSCOPIC WITH ENDOMETRIAL ABLATION N/A 11/16/2016    Procedure: TUBAL FILSHIE CLIPPING LAPAROSCOPIC;  Surgeon: Vikram Clark DO;  Location: [facility];  Service:        Problem List:  Patient Active Problem List   Diagnosis    Postoperative abscess       Allergy:   No Known Allergies     Current Medications:   Current Outpatient Medications   Medication Sig Dispense Refill    amitriptyline (ELAVIL) 50 MG tablet Take 1 tablet by mouth Every Night. ½ tablet Sun-Thurs, full tablet on Fri-Sat      aspirin 81 MG EC tablet Take 1 tablet by mouth Daily.      atorvastatin (LIPITOR) 40 MG tablet Take 1 tablet by mouth Daily.      cetirizine (zyrTEC) 10 MG tablet Take 1 tablet by mouth Daily.      Farxiga 10 MG  "tablet Take 10 mg by mouth Every Morning. for diabetes.      glipiZIDE (GLUCOTROL) 5 MG tablet Take 2 tablets by mouth 2 (Two) Times a Day Before Meals.      Januvia 100 MG tablet TAKE ONE TABLET BY MOUTH EVERY DAY FOR DIABETES      Lantus SoloStar 100 UNIT/ML injection pen 95 Units Every Night.      losartan-hydrochlorothiazide (HYZAAR) 100-25 MG per tablet Take 1 tablet by mouth Daily.      metoprolol succinate XL (TOPROL-XL) 25 MG 24 hr tablet Take 1 tablet by mouth Daily.      montelukast (SINGULAIR) 10 MG tablet Take 1 tablet by mouth Every Night.      nicotine (NICODERM CQ) 21 MG/24HR patch Place 1 patch on clean, dry, hairless skin as directed by provider daily rotating sites. Remove old patch prior to applying new patch. May remove at bedtime if needed to prevent insomnia. Do Not Use Other Nicotine Products. 28 patch 0    nicotine polacrilex (NICORETTE) 4 MG gum Chew 1 piece Every 1 hour as needed for smoking cessation. Maximum 24 pieces in 24 hours. Gum should be chewed until it tingles, then \"park\" between gums & cheek. When tingling stops, chew again until tingling returns & once again \"park\" Between Gums & Cheek. Repeat until tingling is gone, then discard. Do not use with other nicotine products. 240 each 0    omeprazole (priLOSEC) 20 MG capsule Take 1 capsule by mouth Daily.       No current facility-administered medications for this visit.       Review of Systems:    Review of Systems   Respiratory:  Positive for shortness of breath.    Musculoskeletal:  Positive for arthralgias and back pain.         Physical Exam:   Physical Exam  Constitutional:       Appearance: Normal appearance. She is obese.   HENT:      Head: Normocephalic and atraumatic.      Right Ear: External ear normal.      Left Ear: External ear normal.   Eyes:      Conjunctiva/sclera: Conjunctivae normal.      Pupils: Pupils are equal, round, and reactive to light.   Cardiovascular:      Pulses: Normal pulses.   Pulmonary:      Effort: " "Pulmonary effort is normal.   Abdominal:      General: Abdomen is flat.   Musculoskeletal:         General: Normal range of motion.      Cervical back: Normal range of motion and neck supple.   Skin:     General: Skin is warm and dry.      Capillary Refill: Capillary refill takes less than 2 seconds.   Neurological:      General: No focal deficit present.      Mental Status: She is alert and oriented to person, place, and time.   Psychiatric:         Mood and Affect: Mood normal.         Behavior: Behavior normal.         Vitals:  Height 175.3 cm (69.02\"), weight 129 kg (284 lb 12.8 oz), not currently breastfeeding.   Body mass index is 42.04 kg/m².    Ideal body weight: 146 pounds  Excess body weight: 139 pounds  Goal weight: 271 pounds with 14 pounds weight loss     Assessment & Plan   Diagnoses and all orders for this visit:    1. BMI 40.0-44.9, adult (Primary)  -     Case Request; Standing  -     Case Request    2. Morbid obesity  -     Case Request; Standing  -     Case Request    Other orders  -     Follow Anesthesia Guidelines / Protocol; Future  -     Follow Anesthesia Guidelines / Protocol; Standing  -     Verify / Perform Chlorhexidine Skin Prep; Standing  -     Verify / Perform Chlorhexidine Skin Prep if Indicated (If Not Already Completed); Standing  -     Obtain Informed Consent; Future  -     Provide NPO Instructions to Patient; Future  -     Chlorhexidine Skin Prep; Future  -     Place Sequential Compression Device; Standing  -     Maintain Sequential Compression Device; Standing        Shahrzad is a 48-year-old morbidly obese female who presents with a past medical history significant for diabetes, hyperlipidemia, asthma, GERD and hypertension.     She will meet with nutrition today.  She will follow-up with him in 4 weeks.    She will have UGI and psychiatric evaluation scheduled.  Patient will also undergo EGD with Dr. Rudd.  Verbalized understanding prep instructions and procedure wished to " proceed.    Verbalized understanding weight loss goal being 271 pounds.  She will follow-up in 4 weeks.      Visit Diagnoses:    ICD-10-CM ICD-9-CM   1. BMI 40.0-44.9, adult  Z68.41 V85.41   2. Morbid obesity  E66.01 278.01         MEDS ORDERED DURING VISIT:  No orders of the defined types were placed in this encounter.      No follow-ups on file.             This document has been electronically signed by HARITHA Arteaga  April 26, 2024 13:33 EDT    Please note that portions of this note were completed with a voice recognition program.

## 2024-04-27 ENCOUNTER — DOCUMENTATION (OUTPATIENT)
Dept: NUTRITION | Facility: HOSPITAL | Age: 49
End: 2024-04-27
Payer: COMMERCIAL

## 2024-04-27 NOTE — PROGRESS NOTES
"Bariatric Nutrition Counseling Interview    Patient Name:  Shahrzad Horton  YOB: 1975  Age:  48 y.o.  Sex:  female  MRN: [id number]  Date:  4/26/24 - late entry     Procedure Considering:  Sleeve    Last Documented Height:    Ht Readings from Last 1 Encounters:   04/26/24 175.3 cm (69.02\")     Last Documented Weight:   Wt Readings from Last 1 Encounters:   04/26/24 129 kg (284 lb 12.8 oz)      There is no height or weight on file to calculate BMI.    Goal Weight: 271 lbs     History:  Past Medical History:   Diagnosis Date    Abnormal Pap smear of cervix     Abnormal vaginal bleeding     Arthritis     Asthma     Cervical cancer 2000    Had cryo surgery and part of cervix removed    Diabetes mellitus     Takes oral medication    Hyperlipidemia     Hypertension     Migraine     Rh incompatibility     Substance abuse     Nancy Beard    TIA (transient ischemic attack) 2012    Urogenital trichomoniasis 2010    Was tx'd     Past Surgical History:   Procedure Laterality Date    CERVICAL BIOPSY      CHOLECYSTECTOMY      DILATATION AND CURETTAGE      ENDOMETRIAL ABLATION      GYNECOLOGIC CRYOSURGERY      HYSTEROSCOPY N/A 11/16/2016    Procedure: HYSTEROSCOPY, D&C,  ENDOMETRIAL ABLATION;  Surgeon: Vikram Clark DO;  Location: [facility] OR;  Service:     PERINEAL ABSCESS INCISION AND DRAINAGE Right 9/4/2018    Procedure: INCISION AND DRAINAGE OF RIGHT VULVA ABSCESS;  Surgeon: Shannan Farfan DO;  Location: [facility] OR;  Service: Obstetrics/Gynecology    TUBAL ABDOMINAL LIGATION      TUBAL FILSHIE CLIPPING LAPAROSCOPIC WITH ENDOMETRIAL ABLATION N/A 11/16/2016    Procedure: TUBAL FILSHIE CLIPPING LAPAROSCOPIC;  Surgeon: Vikram Clark DO;  Location: [facility] OR;  Service:      Family History   Problem Relation Age of Onset    Breast cancer Maternal Grandmother     Breast cancer Maternal Aunt      Social History     Socioeconomic History    Marital status:    Tobacco Use    Smoking status: " Every Day     Current packs/day: 1.50     Average packs/day: 1.4 packs/day for 38.6 years (54.0 ttl pk-yrs)     Types: Cigarettes     Start date: 9/4/1985     Passive exposure: Never    Smokeless tobacco: Never   Vaping Use    Vaping status: Never Used   Substance and Sexual Activity    Alcohol use: Yes     Alcohol/week: 2.0 standard drinks of alcohol     Types: 2 Cans of beer per week     Comment: ocas    Drug use: Yes     Frequency: 3.0 times per week     Types: Marijuana     Comment: 11/14/16    Sexual activity: Defer     Additional Health Issues to Consider:  DM, GERD, HTN, Hyperlipidemia, Asthma     Weight History:  Always been overweight    Previous Weight Loss Efforts:  Weight Watchers, Calorie counting, Adipex, and wt loss injections.  Patient stated she has tried her whole life to loose weight and tried many fad diets.   Most Successful Weight Loss Effort:   Per patient nothing has worked for her.     Eating Habits:  Skips breakfast most of the time, light lunch, and large dinner meal.   Eat three meals on most days?  No  Worst eating habit?   Sweetened beverages - Mt. Dew, but patient is down to drinking 1 per day.     How often do you eat fast food? monthly    Do you exercise regularly? 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     Personal Goal After Procedure:  She stated she just wants to be healthy and be able to get rid of so many medications.     Personal Support:  significant other, mother, and sister.     Assessment:  Patient stated she has struggled with weight loss her whole life. Patient has already started to make some positive changes.  She is down to 1 per day of carbonated beverages and is decreasing smoking.  She is trying to be quit by this next month visit.     Patient was educated on a 1500 kcal diet and provided with sample menus.  We reviewed portion control, fluid intake,  consistent meals/ snacks, and healthy cooking options.  I provided patient with gastric sleeve manual and reviewed nutrition guidelines for before and after surgery.  We discussed protein drinks / options.   Patient stated she does well eating fruits and vegetables.  She is trying to cut back on carb amounts she eats. I reviewed carbohydrate counting due to dx of DM and reviewed reading food labels.      We also set 3 goals for weight loss:   Eating consistent meals / snacks  Begin exercise regimen of 3 days per week for 30-45 minutes and will continue to build on time and intensity.   Stop smoking.  Stop drinking sugary beverages - we reviewed alternate options.    Patient seems motivated and was receptive to information reviewed. Will continue to follow monthly.       Electronically signed by:  Naomy Rick RD  04/27/24 15:55 EDT

## 2024-05-02 ENCOUNTER — HOSPITAL ENCOUNTER (OUTPATIENT)
Dept: GENERAL RADIOLOGY | Facility: HOSPITAL | Age: 49
Discharge: HOME | End: 2024-05-02
Payer: COMMERCIAL

## 2024-05-02 DIAGNOSIS — E66.01 MORBID OBESITY: ICD-10-CM

## 2024-05-02 PROCEDURE — 74246 X-RAY XM UPR GI TRC 2CNTRST: CPT | Performed by: RADIOLOGY

## 2024-05-02 PROCEDURE — 74246 X-RAY XM UPR GI TRC 2CNTRST: CPT

## 2024-05-07 PROBLEM — E66.01 MORBID OBESITY: Status: ACTIVE | Noted: 2024-04-26

## 2024-05-08 ENCOUNTER — TELEPHONE (OUTPATIENT)
Dept: SURGERY | Facility: CLINIC | Age: 49
End: 2024-05-08
Payer: COMMERCIAL

## 2024-05-17 ENCOUNTER — ANESTHESIA EVENT (OUTPATIENT)
Dept: PERIOP | Facility: HOSPITAL | Age: 49
End: 2024-05-17
Payer: COMMERCIAL

## 2024-05-17 ENCOUNTER — ANESTHESIA (OUTPATIENT)
Dept: PERIOP | Facility: HOSPITAL | Age: 49
End: 2024-05-17
Payer: COMMERCIAL

## 2024-05-17 ENCOUNTER — HOSPITAL ENCOUNTER (OUTPATIENT)
Facility: HOSPITAL | Age: 49
Setting detail: HOSPITAL OUTPATIENT SURGERY
Discharge: HOME | End: 2024-05-17
Attending: SURGERY | Admitting: SURGERY
Payer: COMMERCIAL

## 2024-05-17 VITALS
RESPIRATION RATE: 16 BRPM | DIASTOLIC BLOOD PRESSURE: 62 MMHG | OXYGEN SATURATION: 95 % | SYSTOLIC BLOOD PRESSURE: 106 MMHG | HEIGHT: 69 IN | WEIGHT: 281 LBS | TEMPERATURE: 97.5 F | HEART RATE: 83 BPM | BODY MASS INDEX: 41.62 KG/M2

## 2024-05-17 DIAGNOSIS — E66.01 MORBID OBESITY: ICD-10-CM

## 2024-05-17 LAB — GLUCOSE BLDC GLUCOMTR-MCNC: 156 MG/DL (ref 70–130)

## 2024-05-17 PROCEDURE — 82948 REAGENT STRIP/BLOOD GLUCOSE: CPT

## 2024-05-17 PROCEDURE — 25810000003 LACTATED RINGERS PER 1000 ML: Performed by: ANESTHESIOLOGY

## 2024-05-17 PROCEDURE — 25010000002 PROPOFOL 200 MG/20ML EMULSION: Performed by: NURSE ANESTHETIST, CERTIFIED REGISTERED

## 2024-05-17 RX ORDER — MEPERIDINE HYDROCHLORIDE 25 MG/ML
12.5 INJECTION INTRAMUSCULAR; INTRAVENOUS; SUBCUTANEOUS
Status: DISCONTINUED | OUTPATIENT
Start: 2024-05-17 | End: 2024-05-17 | Stop reason: HOSPADM

## 2024-05-17 RX ORDER — PROPOFOL 10 MG/ML
INJECTION, EMULSION INTRAVENOUS AS NEEDED
Status: DISCONTINUED | OUTPATIENT
Start: 2024-05-17 | End: 2024-05-17 | Stop reason: SURG

## 2024-05-17 RX ORDER — MIDAZOLAM HYDROCHLORIDE 1 MG/ML
1 INJECTION INTRAMUSCULAR; INTRAVENOUS
Status: DISCONTINUED | OUTPATIENT
Start: 2024-05-17 | End: 2024-05-17 | Stop reason: HOSPADM

## 2024-05-17 RX ORDER — IPRATROPIUM BROMIDE AND ALBUTEROL SULFATE 2.5; .5 MG/3ML; MG/3ML
3 SOLUTION RESPIRATORY (INHALATION) ONCE AS NEEDED
Status: DISCONTINUED | OUTPATIENT
Start: 2024-05-17 | End: 2024-05-17 | Stop reason: HOSPADM

## 2024-05-17 RX ORDER — SODIUM CHLORIDE 0.9 % (FLUSH) 0.9 %
10 SYRINGE (ML) INJECTION AS NEEDED
Status: DISCONTINUED | OUTPATIENT
Start: 2024-05-17 | End: 2024-05-17 | Stop reason: HOSPADM

## 2024-05-17 RX ORDER — SODIUM CHLORIDE, SODIUM LACTATE, POTASSIUM CHLORIDE, CALCIUM CHLORIDE 600; 310; 30; 20 MG/100ML; MG/100ML; MG/100ML; MG/100ML
125 INJECTION, SOLUTION INTRAVENOUS ONCE
Status: COMPLETED | OUTPATIENT
Start: 2024-05-17 | End: 2024-05-17

## 2024-05-17 RX ORDER — SODIUM CHLORIDE 0.9 % (FLUSH) 0.9 %
10 SYRINGE (ML) INJECTION EVERY 12 HOURS SCHEDULED
Status: DISCONTINUED | OUTPATIENT
Start: 2024-05-17 | End: 2024-05-17 | Stop reason: HOSPADM

## 2024-05-17 RX ORDER — ONDANSETRON 2 MG/ML
4 INJECTION INTRAMUSCULAR; INTRAVENOUS AS NEEDED
Status: DISCONTINUED | OUTPATIENT
Start: 2024-05-17 | End: 2024-05-17 | Stop reason: HOSPADM

## 2024-05-17 RX ORDER — SODIUM CHLORIDE 9 MG/ML
40 INJECTION, SOLUTION INTRAVENOUS AS NEEDED
Status: DISCONTINUED | OUTPATIENT
Start: 2024-05-17 | End: 2024-05-17 | Stop reason: HOSPADM

## 2024-05-17 RX ORDER — SODIUM CHLORIDE, SODIUM LACTATE, POTASSIUM CHLORIDE, CALCIUM CHLORIDE 600; 310; 30; 20 MG/100ML; MG/100ML; MG/100ML; MG/100ML
100 INJECTION, SOLUTION INTRAVENOUS ONCE AS NEEDED
Status: DISCONTINUED | OUTPATIENT
Start: 2024-05-17 | End: 2024-05-17 | Stop reason: HOSPADM

## 2024-05-17 RX ADMIN — SODIUM CHLORIDE, POTASSIUM CHLORIDE, SODIUM LACTATE AND CALCIUM CHLORIDE: 600; 310; 30; 20 INJECTION, SOLUTION INTRAVENOUS at 09:34

## 2024-05-17 RX ADMIN — PROPOFOL 110 MG: 10 INJECTION, EMULSION INTRAVENOUS at 09:37

## 2024-05-17 NOTE — ANESTHESIA POSTPROCEDURE EVALUATION
Patient: Shahrzad Horton    Procedure Summary       Date: 05/17/24 Room / Location: [facility] OR  /  [facility] OR    Anesthesia Start: 0934 Anesthesia Stop: 0943    Procedure: ESOPHAGOGASTRODUODENOSCOPY WITH ANESTHESIA (Esophagus) Diagnosis:       BMI 40.0-44.9, adult      Morbid obesity      (BMI 40.0-44.9, adult [Z68.41])      (Morbid obesity [E66.01])    Surgeons: Luis M Rudd MD Provider: Elan Christianson MD    Anesthesia Type: general ASA Status: 3            Anesthesia Type: general    Vitals  Vitals Value Taken Time   /62 05/17/24 1005   Temp 97.5 °F (36.4 °C) 05/17/24 0945   Pulse 84 05/17/24 1008   Resp 16 05/17/24 1005   SpO2 97 % 05/17/24 1008   Vitals shown include unfiled device data.        Post Anesthesia Care and Evaluation    Patient location during evaluation: PHASE II  Patient participation: complete - patient participated  Level of consciousness: awake and alert  Pain score: 1  Pain management: adequate    Airway patency: patent  Anesthetic complications: No anesthetic complications  PONV Status: controlled  Cardiovascular status: acceptable  Respiratory status: acceptable  Hydration status: acceptable

## 2024-05-17 NOTE — ANESTHESIA PREPROCEDURE EVALUATION
Anesthesia Evaluation     no history of anesthetic complications:   NPO Solid Status: > 8 hours  NPO Liquid Status: > 8 hours           Airway   Mallampati: II  TM distance: >3 FB  Neck ROM: full  No difficulty expected  Dental - normal exam     Pulmonary - normal exam   (+) a smoker Current, asthma,  Cardiovascular - normal exam    (+) hypertension, hyperlipidemia      Neuro/Psych  (+) TIA, headaches  GI/Hepatic/Renal/Endo    (+) obesity, morbid obesity, GERD, diabetes mellitus    Musculoskeletal     Abdominal  - normal exam    Bowel sounds: normal.   Substance History      OB/GYN          Other   arthritis,   history of cancer                Anesthesia Plan    ASA 3     general     intravenous induction     Anesthetic plan, risks, benefits, and alternatives have been provided, discussed and informed consent has been obtained with: patient.    CODE STATUS:

## 2024-05-20 LAB — REF LAB TEST METHOD: NORMAL

## 2024-05-24 ENCOUNTER — DOCUMENTATION (OUTPATIENT)
Dept: NUTRITION | Facility: HOSPITAL | Age: 49
End: 2024-05-24

## 2024-05-24 ENCOUNTER — OFFICE VISIT (OUTPATIENT)
Dept: SURGERY | Facility: CLINIC | Age: 49
End: 2024-05-24
Payer: COMMERCIAL

## 2024-05-24 VITALS — BODY MASS INDEX: 43.04 KG/M2 | HEIGHT: 69 IN | WEIGHT: 290.6 LBS

## 2024-05-24 DIAGNOSIS — E66.01 MORBID OBESITY: ICD-10-CM

## 2024-05-24 NOTE — PROGRESS NOTES
Subjective   Shahrzad Horton is a 48 y.o. female who presents today for Follow Up    Chief Complaint:    Chief Complaint   Patient presents with    Post-op        History of Present Illness:    History of Present Illness Shahrzad is a 48-year-old morbidly obese female who presents for third bariatric follow-up visit.  She recently underwent her EGD with Dr. Rudd.  She states that she is unsure why she has had weight gain.  Patient's last documented weight was 291 pounds on 5/17/2024.  Today she is 290 pounds 9.6 ounces.  She states that she frequently goes between 275 and pounds.  She states that she is still walking approximately every other day when the weather is nice.  She is currently still smoking, however, she is still trying to quit and has cut back.  In regards to diet she states that she is doing the same as before.  Trying to remember to drink a protein shake as a replacement for breakfast.  She is currently trying to find a flavor that she can tolerate.    The following portions of the patient's history were reviewed and updated as appropriate: allergies, current medications, past family history, past medical history, past social history, past surgical history and problem list.    Past Medical History:  Past Medical History:   Diagnosis Date    Abnormal Pap smear of cervix     Abnormal vaginal bleeding     Arthritis     Asthma     Cervical cancer 2000    Had cryo surgery and part of cervix removed    Diabetes mellitus     Takes oral medication    Elevated cholesterol     GERD (gastroesophageal reflux disease)     Hyperlipidemia     Hypertension     Migraine     Rh incompatibility     Substance abuse     Nancy Beard    TIA (transient ischemic attack) 2012    Urogenital trichomoniasis 2010    Was tx'd       Social History:  Social History     Socioeconomic History    Marital status:    Tobacco Use    Smoking status: Every Day     Current packs/day: 1.50     Average packs/day: 1.4 packs/day  for 38.7 years (54.1 ttl pk-yrs)     Types: Cigarettes     Start date: 9/4/1985     Passive exposure: Never    Smokeless tobacco: Never   Vaping Use    Vaping status: Never Used   Substance and Sexual Activity    Alcohol use: Yes     Alcohol/week: 2.0 standard drinks of alcohol     Types: 2 Cans of beer per week     Comment: ocas    Drug use: Yes     Frequency: 3.0 times per week     Types: Marijuana     Comment: 11/14/16    Sexual activity: Defer       Family History:  Family History   Problem Relation Age of Onset    Breast cancer Maternal Grandmother     Breast cancer Maternal Aunt        Past Surgical History:  Past Surgical History:   Procedure Laterality Date    CERVICAL BIOPSY      CHOLECYSTECTOMY      DILATATION AND CURETTAGE      ENDOMETRIAL ABLATION      ENDOSCOPY N/A 5/17/2024    Procedure: ESOPHAGOGASTRODUODENOSCOPY WITH ANESTHESIA;  Surgeon: Luis M Rudd MD;  Location: [facility];  Service: Gastroenterology;  Laterality: N/A;    GYNECOLOGIC CRYOSURGERY      HYSTEROSCOPY N/A 11/16/2016    Procedure: HYSTEROSCOPY, D&C,  ENDOMETRIAL ABLATION;  Surgeon: Vikram Clark DO;  Location: [facility];  Service:     PERINEAL ABSCESS INCISION AND DRAINAGE Right 9/4/2018    Procedure: INCISION AND DRAINAGE OF RIGHT VULVA ABSCESS;  Surgeon: Shannan Farfan DO;  Location: [facility];  Service: Obstetrics/Gynecology    TUBAL ABDOMINAL LIGATION      TUBAL FILSHIE CLIPPING LAPAROSCOPIC WITH ENDOMETRIAL ABLATION N/A 11/16/2016    Procedure: TUBAL FILSHIE CLIPPING LAPAROSCOPIC;  Surgeon: Vikram Clark DO;  Location: [facility];  Service:        Problem List:  Patient Active Problem List   Diagnosis    Postoperative abscess    BMI 40.0-44.9, adult    Morbid obesity       Allergy:   No Known Allergies     Current Medications:   Current Outpatient Medications   Medication Sig Dispense Refill    amitriptyline (ELAVIL) 50 MG tablet Take 1 tablet by mouth Every Night. ½ tablet Sun-Thurs, full tablet on  "Fri-Sat      aspirin 81 MG EC tablet Take 1 tablet by mouth Daily.      atorvastatin (LIPITOR) 40 MG tablet Take 1 tablet by mouth Daily.      cetirizine (zyrTEC) 10 MG tablet Take 1 tablet by mouth Daily.      Farxiga 10 MG tablet Take 10 mg by mouth Every Morning. for diabetes.      glipiZIDE (GLUCOTROL) 5 MG tablet Take 2 tablets by mouth 2 (Two) Times a Day Before Meals.      Januvia 100 MG tablet TAKE ONE TABLET BY MOUTH EVERY DAY FOR DIABETES      Lantus SoloStar 100 UNIT/ML injection pen 95 Units Every Night.      losartan-hydrochlorothiazide (HYZAAR) 100-25 MG per tablet Take 1 tablet by mouth Daily.      metoprolol succinate XL (TOPROL-XL) 25 MG 24 hr tablet Take 1 tablet by mouth Daily.      montelukast (SINGULAIR) 10 MG tablet Take 1 tablet by mouth Every Night.      nicotine (NICODERM CQ) 21 MG/24HR patch Place 1 patch on clean, dry, hairless skin as directed by provider daily rotating sites. Remove old patch prior to applying new patch. May remove at bedtime if needed to prevent insomnia. Do Not Use Other Nicotine Products. 28 patch 0    nicotine polacrilex (NICORETTE) 4 MG gum Chew 1 piece Every 1 hour as needed for smoking cessation. Maximum 24 pieces in 24 hours. Gum should be chewed until it tingles, then \"park\" between gums & cheek. When tingling stops, chew again until tingling returns & once again \"park\" Between Gums & Cheek. Repeat until tingling is gone, then discard. Do not use with other nicotine products. 240 each 0    omeprazole (priLOSEC) 20 MG capsule Take 1 capsule by mouth Daily.       No current facility-administered medications for this visit.       Review of Systems:    Review of Systems   Respiratory:  Positive for shortness of breath (With exertion).    Musculoskeletal:  Positive for arthralgias and back pain.         Physical Exam:   Physical Exam  Constitutional:       Appearance: She is obese.   HENT:      Head: Normocephalic and atraumatic.      Right Ear: External ear normal. " "     Left Ear: External ear normal.   Eyes:      Conjunctiva/sclera: Conjunctivae normal.   Pulmonary:      Effort: Pulmonary effort is normal.   Abdominal:      General: Abdomen is flat.      Palpations: Abdomen is soft.   Musculoskeletal:         General: Normal range of motion.      Cervical back: Normal range of motion and neck supple.   Skin:     General: Skin is warm and dry.      Capillary Refill: Capillary refill takes less than 2 seconds.   Neurological:      General: No focal deficit present.      Mental Status: She is alert and oriented to person, place, and time.   Psychiatric:         Mood and Affect: Mood normal.         Behavior: Behavior normal.         Vitals:  Height 175.3 cm (69.02\"), weight 132 kg (290 lb 9.6 oz), not currently breastfeeding.   Body mass index is 42.89 kg/m².   Ideal body weight: 146 pounds  Excess body weight: 139 pounds  Goal weight: 271 pounds with 14 pounds weight loss    Lab Results:   Admission on 2024, Discharged on 2024   Component Date Value Ref Range Status    Glucose 2024 156 (H)  70 - 130 mg/dL Final    Reference Lab Report 2024    Final                    Value:[facility]  [address] [place] [address]  Phone: [phone] or [phone]  Fax: [phone]  Ranjit Red M.D., Medical Director    PATIENT NAME                                     LABORATORY NO.  [id number]   CARLOS BERGMAN                                 [id number]  [id number]                                 AGE                    SEX   SSN              CLIENT REF #  [facility] LAURA                      48        1975      F     [id number]      [id number]    [address]                             REQUESTING M.D.           ATTENDING M.D.         COPY TO.  FRANK SANCHEZ [id number]                           SALMA GAYTAN  DATE COLLECTED            DATE RECEIVED          DATE REPORTED  2024             " 05/20/2024    DIAGNOSIS:  GASTRIC ANTRUM, BIOPSY:  Mild chronic gastritis  No intestinal metaplasia or dysplasia identified  No Helicobacter pylori-like organisms identified on routine histologic                           sections    CAM    CLINICAL HISTORY:  BMI 40.0-44.9, adult, morbid obesity    SPECIMENS RECEIVED:  GASTRIC ANTRUM, BIOPSY    MICROSCOPIC DESCRIPTION:  Tissue blocks are prepared and slides are examined microscopically. See  diagnosis for details.    Professional interpretation rendered by Merced Mora M.D., EJ at  [employer], [address] [place] [address].    GROSS DESCRIPTION:  Labeled antrum consisting of 1 piece of tan soft tissue measuring 0.5 x 0.3 x 0.2  cm.  Submitted entirely in 1 cassette.  BAW    REVIEWED, DIAGNOSED AND ELECTRONICALLY  SIGNED BY:    Merced Mora M.D., FIONA.  CPT CODES:  96589           Assessment & Plan   Diagnoses and all orders for this visit:    1. BMI 40.0-44.9, adult (Primary)    2. Morbid obesity      Shahrzad is a morbidly obese 48-year-old female with a past medical history significant for diabetes, hyperlipidemia, asthma, GERD and hypertension.    She will continue to diet and exercise.  Will continue to cut back on smoking with goal of completely quitting.    Verbalized understanding weight loss goal being 271 pounds.    She will follow-up in 4 weeks.    Visit Diagnoses:    ICD-10-CM ICD-9-CM   1. BMI 40.0-44.9, adult  Z68.41 V85.41   2. Morbid obesity  E66.01 278.01         MEDS ORDERED DURING VISIT:  No orders of the defined types were placed in this encounter.      Return in about 4 weeks (around 6/21/2024).             This document has been electronically signed by HARITHA Arteaga  May 24, 2024 13:45 EDT    Please note that portions of this note were completed with a voice recognition program.

## 2024-05-29 ENCOUNTER — OFFICE VISIT (OUTPATIENT)
Dept: PSYCHIATRY | Facility: CLINIC | Age: 49
End: 2024-05-29
Payer: COMMERCIAL

## 2024-05-29 VITALS — HEIGHT: 69 IN | WEIGHT: 292.11 LBS | BODY MASS INDEX: 43.27 KG/M2

## 2024-05-29 DIAGNOSIS — E66.01 CLASS 3 SEVERE OBESITY WITH SERIOUS COMORBIDITY AND BODY MASS INDEX (BMI) OF 40.0 TO 44.9 IN ADULT, UNSPECIFIED OBESITY TYPE: ICD-10-CM

## 2024-05-29 DIAGNOSIS — F17.200 NICOTINE USE DISORDER: ICD-10-CM

## 2024-05-29 DIAGNOSIS — F12.20 CANNABIS USE DISORDER, SEVERE, DEPENDENCE: ICD-10-CM

## 2024-05-29 DIAGNOSIS — R46.89 BEHAVIOR CONCERN: ICD-10-CM

## 2024-05-29 DIAGNOSIS — F50.9 EATING DISORDER, UNSPECIFIED TYPE: Primary | ICD-10-CM

## 2024-05-29 PROCEDURE — 90785 PSYTX COMPLEX INTERACTIVE: CPT | Performed by: COUNSELOR

## 2024-05-29 PROCEDURE — 90791 PSYCH DIAGNOSTIC EVALUATION: CPT | Performed by: COUNSELOR

## 2024-05-29 RX ORDER — GLIPIZIDE 10 MG/1
TABLET ORAL
COMMUNITY
Start: 2024-03-11

## 2024-05-29 RX ORDER — AMITRIPTYLINE HYDROCHLORIDE 50 MG/1
TABLET, FILM COATED ORAL EVERY 24 HOURS
COMMUNITY
Start: 2023-12-08

## 2024-05-29 RX ORDER — METOPROLOL SUCCINATE 25 MG/1
1 TABLET, EXTENDED RELEASE ORAL DAILY
COMMUNITY
Start: 2024-03-11

## 2024-05-29 RX ORDER — ALBUTEROL SULFATE 90 UG/1
2 AEROSOL, METERED RESPIRATORY (INHALATION)
COMMUNITY
Start: 2023-12-08

## 2024-05-29 RX ORDER — ATORVASTATIN CALCIUM 20 MG/1
1 TABLET, FILM COATED ORAL DAILY
COMMUNITY
Start: 2024-03-11

## 2024-05-29 NOTE — PROGRESS NOTES
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 identified treatment plan and is receptive to receiving assistance on how to cope with and/or resolve reported issues.  Patient expresses gratitude and states she had a positive experience today. The diagnoses today include: The primary encounter diagnosis was Eating disorder, unspecified type. Diagnoses of Cannabis use disorder, severe, dependence, Nicotine use disorder, Class 3 severe obesity with serious comorbidity and body mass index (BMI) of 40.0 to 44.9 in adult, unspecified obesity type, and Behavior concern Contemplation Stage of Change were also pertinent to this visit.     [x] Intake/Biopsychosocial completed  [x] Bariatric Assessment completed  [] Discussed the diagnosis with the patient and all questions answered.  [x] Interim plan in place  [] Initial care and treatment plans completed  [x] Crisis assessment  [] Discussed crisis intervention services and means to access   [x] All treatment options available at [facility] explored and documented.  [] Reviewed previous available documentation  [] Reviewed most recent available labs   [] Educational material distributed.  [x] Recommended Referrals: Nutritionist, Medical Provider (PCP), and Other:  Weight Mgmt Program    Diet/Exercise Education: The benefits of a healthy  diet and exercise were discussed with patient, especially the positive effects they have on mental health. Patient encouraged to consider lifestyle modification regarding  diet and exercise patterns to maximize results of mental health treatment;  Exercise/Activity Education Discussed; Behavior Modification Discussed.    Prognosis:  The prognosis regarding these disorders is undetermined. Unique factors influencing recovery include: existing premorbid conditions, symptom chronicity, symptom severity, degree of impairment, patient engagement, motivation and medication adherence.  It is established this individual suffers from a chronic/pervasive mental illness which has caused significant  impairment in at least two important important areas of functioning.  Patient appears to be stable at this time.  Patient can reasonably be expected to continue to benefit from treatment and would likely be at increased risk for decompensation if treatment were stopped.  Patient endorses a positive benefit from therapy.   Patient will be admitted to the [facility].  Patient expresses gratitude and states she had a positive experience today.    Permission to Treat:  The patient understands that treatment is conditional on adhering to all [facility] Outpatient Policy and Procedures.  The patient understands that providers/clinic has discretion to dismissed them from care if these are breached and a recommendation for further care will be made at time of discharge.    Follow Up:  Return in about 2 weeks, or earlier if symptoms worsen or fail to improve for next follow up visit.    Plan    1)  Complete and return Intake Assessment Pkt     Future Appointments         Provider Department Center    6/13/2024 12:30 PM Angelique Saavedra, [facility]    6/28/2024 1:00 PM Nathanael Perez APRN [facility] GENERAL SURGERY [facility]               This document electronically signed by Angelique Saavedra, OLE-S, [facility] May 29, 2024 17:33 EDT

## 2024-06-13 ENCOUNTER — OFFICE VISIT (OUTPATIENT)
Dept: PSYCHIATRY | Facility: CLINIC | Age: 49
End: 2024-06-13
Payer: COMMERCIAL

## 2024-06-13 VITALS — WEIGHT: 291.01 LBS | HEIGHT: 69 IN | BODY MASS INDEX: 43.1 KG/M2

## 2024-06-13 DIAGNOSIS — E66.01 CLASS 3 SEVERE OBESITY WITH SERIOUS COMORBIDITY AND BODY MASS INDEX (BMI) OF 40.0 TO 44.9 IN ADULT, UNSPECIFIED OBESITY TYPE: ICD-10-CM

## 2024-06-13 DIAGNOSIS — F50.9 EATING DISORDER, UNSPECIFIED TYPE: Primary | ICD-10-CM

## 2024-06-13 DIAGNOSIS — F12.20 CANNABIS USE DISORDER, SEVERE, DEPENDENCE: ICD-10-CM

## 2024-06-13 DIAGNOSIS — Z76.89 SLEEP CONCERN: ICD-10-CM

## 2024-06-13 DIAGNOSIS — F17.200 NICOTINE USE DISORDER: ICD-10-CM

## 2024-06-13 PROCEDURE — 1159F MED LIST DOCD IN RCRD: CPT | Performed by: COUNSELOR

## 2024-06-13 PROCEDURE — 90785 PSYTX COMPLEX INTERACTIVE: CPT | Performed by: COUNSELOR

## 2024-06-13 PROCEDURE — 90837 PSYTX W PT 60 MINUTES: CPT | Performed by: COUNSELOR

## 2024-06-13 PROCEDURE — 1160F RVW MEDS BY RX/DR IN RCRD: CPT | Performed by: COUNSELOR

## 2024-06-13 NOTE — TREATMENT PLAN
Multi-Disciplinary Problems (from Behavioral Health Treatment Plan)      Active Problems       Problem: Eating Disorders  Start Date: 06/13/24      Problem Details: The patient self-scales this problem as a 10 with 10 being the worst.          Goal Priority Start Date Expected End Date End Date    Patient will develop healthy eating patterns while improving positive self regard. -- 06/13/24 -- --    Goal Details: Progress toward goal:  Not appropriate to rate progress toward goal since this is the initial treatment plan.          Goal Intervention Frequency Start Date End Date    Assist patient in processing body image issues. Q Month 06/13/24 --    Intervention Details: Duration of treatment until until remission of symptoms.          Goal Intervention Frequency Start Date End Date    Educate about healthy eating patterns. Q Month 06/13/24 --    Intervention Details: Duration of treatment until until remission of symptoms.          Goal Intervention Frequency Start Date End Date    Identify triggers to unhealthy eating patterns. Q Month 06/13/24 --    Intervention Details: Duration of treatment until until remission of symptoms.                  Problem: Bariatrics  Start Date: 06/13/24      Problem Details: The patient self-scales this problem as a 10 with 10 being the worst.        Goal Priority Start Date Expected End Date End Date    Patient will successfully maintain current weight with no weight gain noted -- 06/13/24 -- --    Goal Details: Progress toward goal:  Not appropriate to rate progress toward goal since this is the initial treatment plan.        Goal Intervention Frequency Start Date End Date    Assist pt in processing current weight/BMI and level of obesity. Q Month 06/13/24 --    Intervention Details: Duration of treatment until until remission of symptoms.        Goal Intervention Frequency Start Date End Date    Educate the pt about healthy eating patterns Q Month 06/13/24 --    Intervention  Details: Duration of treatment until until remission of symptoms.        Goal Intervention Frequency Start Date End Date    Identify triggers to unhealthy eating patterns Q Month 06/13/24 --    Intervention Details: Duration of treatment until until remission of symptoms.        Goal Priority Start Date Expected End Date End Date    Patient will reduce body weight as determined by bariatric treatment team -- 06/13/24 -- --    Goal Details: Progress toward goal:  Not appropriate to rate progress toward goal since this is the initial treatment plan.        Goal Intervention Frequency Start Date End Date    Assist pt with making realistic weight loss and exercise goals Q Month 06/13/24 --    Intervention Details: Duration of treatment until until remission of symptoms.        Goal Intervention Frequency Start Date End Date    Assist pt with setting up an ideal lifestyle change plan Q Month 06/13/24 --    Intervention Details: Duration of treatment until until remission of symptoms.        Goal Intervention Frequency Start Date End Date    Assist pt in using self-monitorying checklists to improve outcomes Q Month 06/13/24 --    Intervention Details: Duration of treatment until until remission of symptoms.        Goal Priority Start Date Expected End Date End Date    Pt will maintain a lower body weight/BMI over a longer period of time -- 06/13/24 -- --    Goal Details: Progress toward goal:  Not appropriate to rate progress toward goal since this is the initial treatment plan.        Goal Intervention Frequency Start Date End Date    Assist pt with identifying and developing healthy coping strategies Q Month 06/13/24 --    Intervention Details: Duration of treatment until until remission of symptoms.        Goal Priority Start Date Expected End Date End Date    Pt will demonstrate the ability to internalize positive messaging that is reflected in behavioral changes -- 06/13/24 -- --    Goal Details: Progress toward  goal:  Not appropriate to rate progress toward goal since this is the initial treatment plan.        Goal Intervention Frequency Start Date End Date    Assist pt in identifying distorted negative beliefs about self Q Month 06/13/24 --    Intervention Details: Duration of treatment until until remission of symptoms.        Goal Intervention Frequency Start Date End Date    Reinforce the use of more realistic positive messaging about self when interpreting life events Q Month 06/13/24 --    Intervention Details: Duration of treatment until until remission of symptoms.                               I have discussed and reviewed this treatment plan with the patient.  It has been printed for signatures.

## 2024-06-13 NOTE — PLAN OF CARE
Problem: Eating Disorders  Goal: Patient will develop healthy eating patterns while improving positive self regard.  Outcome: Ongoing, Progressing     Problem: Bariatrics  Goal: Patient will successfully maintain current weight with no weight gain noted  Outcome: Ongoing, Progressing  Goal: Patient will reduce body weight as determined by bariatric treatment team  Outcome: Ongoing, Progressing  Goal: Pt will maintain a lower body weight/BMI over a longer period of time  Outcome: Ongoing, Progressing  Goal: Pt will demonstrate the ability to internalize positive messaging that is reflected in behavioral changes  Outcome: Ongoing, Progressing

## 2024-06-13 NOTE — LETTER
June 13, 2024     HARITHA Arteaga  [address] [place] [address]    Patient: Shahrzad Horton   YOB: 1975   Date of Visit: 6/13/2024     Dear HARITHA Arteaga,    Shahrzad Horton was in my office today. Below are the relevant portions of my assessment and plan of care.    06/13/24    Per your request, Shahrzad Horton (1975) was evaluated and assessed to determine whether or not she is an appropriate candidate for bariatric surgery.  Shahrzad Horton was seen by me on 05/29/24 at the [facility].     It is important to note the presence of the following co-morbidities:   Past Medical History:   Diagnosis Date   • Abnormal Pap smear of cervix    • Abnormal vaginal bleeding    • Arthritis    • Asthma    • Cervical cancer 2000    Had cryo surgery and part of cervix removed   • Diabetes mellitus     Takes oral medication   • Elevated cholesterol    • GERD (gastroesophageal reflux disease)    • Hyperlipidemia    • Hypertension    • Migraine    • Morbid obesity with BMI of 40.0-44.9, adult    • Rh incompatibility    • Substance abuse     [facility]   • TIA (transient ischemic attack) 2012   • Urogenital trichomoniasis 2010    Was tx'd   .  She is currently prescribed   Current Outpatient Medications:   •  albuterol sulfate HFA (Proventil HFA) 108 (90 Base) MCG/ACT inhaler, Inhale 2 puffs Every 4 (Four) Hours., Disp: , Rfl:   •  amitriptyline (ELAVIL) 50 MG tablet, Take  by mouth Daily., Disp: , Rfl:   •  aspirin 81 MG EC tablet, Take 1 tablet by mouth Daily., Disp: , Rfl:   •  atorvastatin (LIPITOR) 20 MG tablet, Take 1 tablet by mouth Daily., Disp: , Rfl:   •  cetirizine (zyrTEC) 10 MG tablet, Take 1 tablet by mouth Daily., Disp: , Rfl:   •  Farxiga 10 MG tablet, Take 10 mg by mouth Every Morning. for diabetes., Disp: , Rfl:   •  glipizide (GLUCOTROL) 10 MG tablet, TAKE ONE TABLET BY MOUTH TWO TIMES A DAY BEFORE A MEAL, Disp: , Rfl:   •  glucose blood test  "strip, USE TO CHECK BLOOD SUGAR LEVEL THREE TIMES A DAY, Disp: , Rfl:   •  Januvia 100 MG tablet, TAKE ONE TABLET BY MOUTH EVERY DAY FOR DIABETES, Disp: , Rfl:   •  Lantus SoloStar 100 UNIT/ML injection pen, 95 Units Every Night., Disp: , Rfl:   •  losartan-hydrochlorothiazide (HYZAAR) 100-25 MG per tablet, Take 1 tablet by mouth Daily., Disp: , Rfl:   •  metoprolol succinate XL (TOPROL-XL) 25 MG 24 hr tablet, Take 1 tablet by mouth Daily., Disp: , Rfl:   •  montelukast (SINGULAIR) 10 MG tablet, Take 1 tablet by mouth Every Night., Disp: , Rfl:   •  nicotine (NICODERM CQ) 21 MG/24HR patch, Place 1 patch on clean, dry, hairless skin as directed by provider daily rotating sites. Remove old patch prior to applying new patch. May remove at bedtime if needed to prevent insomnia. Do Not Use Other Nicotine Products., Disp: 28 patch, Rfl: 0  •  nicotine polacrilex (NICORETTE) 4 MG gum, Chew 1 piece Every 1 hour as needed for smoking cessation. Maximum 24 pieces in 24 hours. Gum should be chewed until it tingles, then \"park\" between gums & cheek. When tingling stops, chew again until tingling returns & once again \"park\" Between Gums & Cheek. Repeat until tingling is gone, then discard. Do not use with other nicotine products., Disp: 240 each, Rfl: 0  •  omeprazole (priLOSEC) 20 MG capsule, Take 1 capsule by mouth Daily., Disp: , Rfl: .      Shahrzad Horton been actively involved and interactive during outpatient counseling sessions.  She appears to be open and willing to address identified issues. Shahrzad Horton has shown an ability to maintain stability as evidenced by no record of voluntary or involuntary psychiatric hospitalizations.  Shahrzad Horton is not currently prescribed facility-administered medications.  However, monthly therapy for support is highly recommended.    It is my professional opinion that Shahrzad Horton has given appropriate reasoning as to why she desires this bariatric surgical procedure including " health related issues and has insight as to expections following the surgery. All in all, Shahrzad Horton has shown an ability to make a working plan to address her mental health issues, and I see no reason as to why she shouldn't move forward with the said procedure.      Please call me at [phone] if you have any questions or concerns.     Sincerely,        Stella Saavedra M.Ed, NCC, LPCC-S, CAMS-II        cc:  Shahrzad Sol

## 2024-06-13 NOTE — PROGRESS NOTES
"[facility]  Outpatient  Progress Note   Patient Status:  Established  Name:  Shahrzad Horton  :  1975  Date of Service: 2024  Time In: 12:33 EDT  Time Out: 1:33 pm     IDENTIFYING INFORMATION:  The patient is a 48 y.o. female who is here today for an outpatient follow-up appointment.  Patient acknowledges receipt and personal review of initial intake assessment.  Patient agrees that it is a fair and honest representation of what was reported.    Patient identifiers utilized: Name and date of birth.     Interactive Complexity Yes If yes, due to:  Third-Party Involvement Other: Bariatric Weight Management Program     Chief Complaint: Patient reports problems with  weight management and poor eating/dietary habits .     Patient Statement:  \"I don't have any concerns or worries over my weight or how I look.  I'm used to it and not self-conscious about it.  My goal for weight loss surgery is to get off of some of my meds.\"     Session Goal:  Patient with explore and process on increasing coping skills, identifying and expressing emotions, self management, perceived control of symptoms and decrease distress, severity of symptoms and functional impairment     Subjective   HPI:  Patient arrived for session on time, clean and casually dressed without evidence of intoxication, withdrawal, or perceptual disturbance. Patient arrived as: age appropriate.  Patient indicates she is an open and willing participant in today's session.      Anxiety: Patient currently endorses  symptoms of anxiety (see FLORI-7) and rates the severity of anxiety symptoms, on a scale of 1-10 (10 is the most severe), a 1.  Patient indicates symptoms have remained the same. and Patient finds it Not difficult at all to engage in meaningful activities of daily life. FLORI 7 Total Score: 1 0-4 = Minimal anxiety (Subclinical)    FLORI-7  Feeling nervous, anxious or on edge: Not at all  Not being able to stop or control worrying: Not " at all  Worrying too much about different things: Not at all  Trouble Relaxing: Not at all  Being so restless that it is hard to sit still: Not at all  Feeling afraid as if something awful might happen: Not at all  Becoming easily annoyed or irritable: Several days  FLORI 7 Total Score: 1  If you checked any problems, how difficult have these problems made it for you to do your work, take care of things at home, or get along with other people: Not difficult at all     Depression: Patient currently endorses symptoms of depression (see PHQ-9) and rates the severity of depressive symptoms, on a scale of 1-10 (10 is the most severe), a 1. Patient indicates symptoms have remained the same. and Patient finds it Not difficult at all to engage in meaningful activities of daily life.  PHQ-9 Total Score: 2 1-4 = Minimal depression    PHQ-9 Depression Screening  Little interest or pleasure in doing things?     Feeling down, depressed, or hopeless? 0-->not at all   Trouble falling or staying asleep, or sleeping too much? 2-->more than half the days   Feeling tired or having little energy? 0-->not at all   Poor appetite or overeating? 0-->not at all   Feeling bad about yourself - or that you are a failure or have let yourself or your family down? 0-->not at all   Trouble concentrating on things, such as reading the newspaper or watching television? 0-->not at all   Moving or speaking so slowly that other people could have noticed? Or the opposite - being so fidgety or restless that you have been moving around a lot more than usual? 0-->not at all   Thoughts that you would be better off dead, or of hurting yourself in some way? 0-->not at all   PHQ-9 Total Score     If you checked off any problems, how difficult have these problems made it for you to do your work, take care of things at home, or get along with other people? not difficult at all     Sleep:  Sleep is fair. She reports getting at least 6 hours of sleep. She reports  "difficulties with falling asleep, and waking during the night. She tells me that she is trying to adjust to a new sleep/wake cycle due to changing jobs.  She denies nightmares.      Appetite is \"normal\" but she is eating 2-3 meals per day, limited snacking if hungry (handful of chips or a little Hafsa cake), snacking will sometimes replace a meal; drinking primarily coke and water, and daily energy drink a Red Bull or Monster energy drinks for 2-3 days per week.    Somatic Symptoms:  Patient endorses health concerns within the past 4 weeks:  stomach pains, back pain, pain in arms, legs, joints, or hips. , feeling tired or having little energy, trouble falling or staying asleep or sleeping too much.  , headaches, pounding heart or racing, shortness of breath, and nausea, gas, or indigestions. .  It is recommended this individual follow up with the identified PCP to address any medical concerns.     Substance Use: denies      Nicotine:  endorses current nicotine user  daily  This individual is encouraged to quit smoking. The nature of nicotine addiction is discussed and the adverse health conditions related to smoking are reviewed. Patient is aware various alternatives are available to help and is not interested in smoking cessation at this time.      Narrative:  Patient reports concerns with Worry or concern over health, Stress (children, parents, family members), Stress (work, school, environmental), Financial problems OR worries, and Thinking or dreaming about something terrible that has happened in the past.  Pt/therapist reviewed intake interview and recommendations for therapy.  Pt agrees to establish care and return for counseling support on a monthly basis.    Objective    Medical History: Areas Reviewed: The following portions of the patient's history were reviewed and updated as appropriate: allergies, current medications, past family history, past medical history, past social history, past surgical " "history, and problem list.    Medication Review:    Current Outpatient Medications:     albuterol sulfate HFA (Proventil HFA) 108 (90 Base) MCG/ACT inhaler, Inhale 2 puffs Every 4 (Four) Hours., Disp: , Rfl:     amitriptyline (ELAVIL) 50 MG tablet, Take  by mouth Daily., Disp: , Rfl:     aspirin 81 MG EC tablet, Take 1 tablet by mouth Daily., Disp: , Rfl:     atorvastatin (LIPITOR) 20 MG tablet, Take 1 tablet by mouth Daily., Disp: , Rfl:     cetirizine (zyrTEC) 10 MG tablet, Take 1 tablet by mouth Daily., Disp: , Rfl:     Farxiga 10 MG tablet, Take 10 mg by mouth Every Morning. for diabetes., Disp: , Rfl:     glipizide (GLUCOTROL) 10 MG tablet, TAKE ONE TABLET BY MOUTH TWO TIMES A DAY BEFORE A MEAL, Disp: , Rfl:     glucose blood test strip, USE TO CHECK BLOOD SUGAR LEVEL THREE TIMES A DAY, Disp: , Rfl:     Januvia 100 MG tablet, TAKE ONE TABLET BY MOUTH EVERY DAY FOR DIABETES, Disp: , Rfl:     Lantus SoloStar 100 UNIT/ML injection pen, 95 Units Every Night., Disp: , Rfl:     losartan-hydrochlorothiazide (HYZAAR) 100-25 MG per tablet, Take 1 tablet by mouth Daily., Disp: , Rfl:     metoprolol succinate XL (TOPROL-XL) 25 MG 24 hr tablet, Take 1 tablet by mouth Daily., Disp: , Rfl:     montelukast (SINGULAIR) 10 MG tablet, Take 1 tablet by mouth Every Night., Disp: , Rfl:     nicotine (NICODERM CQ) 21 MG/24HR patch, Place 1 patch on clean, dry, hairless skin as directed by provider daily rotating sites. Remove old patch prior to applying new patch. May remove at bedtime if needed to prevent insomnia. Do Not Use Other Nicotine Products., Disp: 28 patch, Rfl: 0    nicotine polacrilex (NICORETTE) 4 MG gum, Chew 1 piece Every 1 hour as needed for smoking cessation. Maximum 24 pieces in 24 hours. Gum should be chewed until it tingles, then \"park\" between gums & cheek. When tingling stops, chew again until tingling returns & once again \"park\" Between Gums & Cheek. Repeat until tingling is gone, then discard. Do not use " "with other nicotine products., Disp: 240 each, Rfl: 0    omeprazole (priLOSEC) 20 MG capsule, Take 1 capsule by mouth Daily., Disp: , Rfl:      Medication Compliance:  compliance with medication regimen; Side Effects reported:  no.  Concerns:      Vitals:  Weight:  132 kg (291 lb 0.1 oz)  Height: 175.3 cm (69.02\")  BMI:  Body mass index is 42.95 kg/m².  Education:  The benefits of a healthy diet and exercise were discussed with patient, especially the positive effects they have on mental health. Patient encouraged to consider lifestyle modification regarding  diet and exercise patterns to maximize results of mental health treatment.    Bariatric Review:  Bariatric: Gen appearance Healthy, alert, active, cooperative, and in no distress, Assessment: Major health issue is obesity., Bariatric weight goal:  Under 200 lb , Initial weight: 285 lb, Last documented weight:  292 lb, Current weight: 291 lb, Total weight loss:  1 lb , Patient feels she can get her weight down to by focusing on healthy eating patterns and lifestyle changes over the next 12 months. , Patient remains motivated and dedicated to weight loss and maintaining a healthy lifestyle., Patient is aware of the health hazards of obesity-including diabetes, knee osteoarthritis, and heart disease. , Plan: Discussed the importance of a healthy diet and exercise and maintaining self-care/wellness throughout the bariatric processes., and Plan:  Explore and consider weight loss options: Gastric Sleeve, Healthy diet/Exercise    Assessment & Plan    Trauma Assessment:  Patient denies having been hit, slapped, kicked or otherwise physically hurt by others since last visit as well as having been forced to engage in any unwanted sexual acts since last visit.       Risk Assessment:  Patient adamantly and convincingly denies having SI/HI with or without intent, plans, or means. Patient denies having Hallucinations/Illusions and Delusions.  Patient  denies self-harming " behaviors .    Risk Level: History obtained from: patient and chart review.  Due to historical context and reported clinical markers, it appears patient meets criteria for LOW RISK to engage in self-harm or harm to others.  It is recommended Shahrzad be evaluated and assessed each contact for intent, plan, means and/or lethality each contact.    Behavior Health Review Of Systems:  Pertinent items are noted in HPI.    MENTAL STATUS EXAM   General Appearance:  Cleanly groomed and dressed and well developed  Eye Contact:  Good eye contact  Attitude:  Cooperative and polite  Motor Activity:  Normal gait, posture  Muscle Strength:  Normal  Speech:  Normal rate, tone, volume  Language:  Spontaneous  Mood and affect:  Normal, pleasant, euthymic, appropriate and mood congruent  Hopelessness:  Denies  Loneliness: 2  Thought Process:  Logical, goal-directed and linear  Associations/ Thought Content:  No delusions  Hallucinations:  None  Suicidal Ideations:  Not present  Homicidal Ideation:  Not present  Sensorium:  Alert and clear  Orientation:  Place, time, situation and person  Immediate Recall, Recent, and Remote Memory:  Intact  Attention Span/ Concentration:  Good  Fund of Knowledge:  Appropriate for age and educational level  Intellectual Functioning:  Average range  Insight:  Fair  Judgement:  Fair  Reliability:  Fair  Impulse Control:  Fair    Session Summary: Therapist met individually with patient this date. Discussed progress in treatment and any needs/concerns.  Therapist discussed patient triggers associated with The primary encounter diagnosis was Eating disorder, unspecified type. Diagnoses of Cannabis use disorder, severe, dependence, Nicotine use disorder, Class 3 severe obesity with serious comorbidity and body mass index (BMI) of 40.0 to 44.9 in adult, unspecified obesity type, and Sleep concern were also pertinent to this visit..  Also discussed the importance of active participation, and honesty to the  treatment process.  Encouraged the patient to discuss/vent their feelings, frustrations, and fears concerning their ongoing issues and validated their feelings. Assisted patient in processing above session content; acknowledged and normalized patient's thoughts, feelings, and concerns.  Therapist discussed patient triggers associated with The primary encounter diagnosis was Eating disorder, unspecified type. Diagnoses of Cannabis use disorder, severe, dependence, Nicotine use disorder, Class 3 severe obesity with serious comorbidity and body mass index (BMI) of 40.0 to 44.9 in adult, unspecified obesity type, and Sleep concern were also pertinent to this visit.. Patient expresses gratitude and states she had a positive experience today.     Diagnosis:  Eating disorder, unspecified type  Psychological condition is stable.  Continue current treatment regimen.  Psychological condition  will be reassessed in 4 weeks.    Cannabis use disorder, severe, dependence  THC use is unchanged  Patient struggles with accepting the relationship between marijuana use and anxiety such as marijuana use is higher among people with mental illness and may increase the risk of developing psychotic disorders like schizophrenia. Research has also shown marijuana to be a contributing factor in the development of other mental health problems like anxiety (panic attacks), depression, PTSD, ADHD, and sleep disorders.   THC use will be reassessed in 4 weeks    Nicotine use disorder  Tobacco use is unchanged.  Smoking cessation counseling was provided.  Tobacco use will be reassessed in 4 weeks.    Class 3 severe obesity with serious comorbidity and body mass index (BMI) of 40.0 to 44.9 in adult, unspecified obesity type  Patient's (Body mass index is 42.95 kg/m².) indicates that they are morbidly/severely obese (BMI > 40 or > 35 with obesity - related health condition) with health conditions that include hypertension, diabetes mellitus,  dyslipidemias, GERD, and Elevated Cholesterol . Weight is slightly improved. BMI  is above average; BMI management plan is completed. We discussed low calorie, low carb based diet program, portion control, increasing exercise, Information on healthy weight added to patient's after visit summary, and Ongoing participation in Bariatric Treatment Program     Sleep concern  Newly Identified  Referred to PCP for evaluation/treatment  Sleep concerns will be addressed in 4 weeks    Treatment:  Mental health assessment completed, Substance use assessment completed, Reviewed and updated as appropriate: allergies, current medications, past family history, past medical history, past social history, past surgical history, and problem list, Bariatric assessment/update, Crisis assessment, Diagnostic Review/Update, Initiated initial treatment plan, Reviewed previous available documentation , Reviewed most recent available labs , and Review of Intake Documentation   Recommended referrals:  PCP and Bariatric Treatment Program    Treatment plan status:  Active and Initial 06/13/24   Treatment plan progress: New  Prognosis: Good with Ongoing Treatment   Functional Status: Problematic domains: IADL's (life activities): meal preparation and leisure, ADL'S eating, ambulating, recreational activities/sports, Participating in society, and Moderate impairment   Disposition:   Patient does not appear to be malingering.     Justification for therapy: Patient continues to struggle with a chronic/pervasive mental illness which continues to cause impairment in at least two important important areas of functioning.  Patient appear(s) to maintain relative stability as compared to the  baseline measure.  Patient can reasonably be expected to continue to benefit from treatment and would likely be at increased risk for decompensation if treatment were stopped.  Patient endorses a positive benefit from theapy and continues to meet outpatient level of  care.     Follow Up:  Return in about 4 weeks, or earlier if symptoms worsen or fail to improve for next follow up visit.      The patient understands that treatment is conditional on adhering to all [facility] Outpatient Policy and Procedures.  The patient understands that providers/clinic has discretion to dismissed them from care if these are breached and a recommendation for further care will be made at time of discharge.    Future Appointments   Date Time Provider Department Center   6/28/2024  1:00 PM Nathanael Perez APRN [facility]   7/15/2024  2:30 PM Angelique Saavedra LPCC [facility]       This document electronically signed by CARLOS Chacko, CAMS-II June 13, 2024 12:55 EDT      DISCLOSURE: This document is intended for medical expert use only. Reading of this document by patients and/or patient's family without participating in medical staff guidance may result in misinterpretation and unintended morbidity. Any interpretation of such data is the responsibility of the patient and/or family member responsible for the patient in concert with their primary or specialist providers, and NOT to be left for sources of online searches such as [employer], [employer] or similar queries. Relying on these approaches to knowledge may result in misinterpretation, misguided goals of care and even death should patients or family members try recommendations outside of the realm of professional medical care in a supervised way.    Yuki Disclaimer:  Please note that portions of this documentation may have been completed with a voice recognition program.  Efforts were made to edit this dictation, but occasional words may have been mistranscribed.

## 2024-06-24 ENCOUNTER — DOCUMENTATION (OUTPATIENT)
Dept: NUTRITION | Facility: HOSPITAL | Age: 49
End: 2024-06-24
Payer: COMMERCIAL

## 2024-06-28 ENCOUNTER — DOCUMENTATION (OUTPATIENT)
Dept: NUTRITION | Facility: HOSPITAL | Age: 49
End: 2024-06-28
Payer: COMMERCIAL

## 2024-06-28 NOTE — PROGRESS NOTES
Nutrition Services    Patient Name:  Shahrzad Horton  YOB: 1975  MRN: [id number]  Admit Date:  (Not on file)    Patient failed to keep appointment.      Electronically signed by:  Naomy Rick RD  06/28/24 14:36 EDT

## 2024-06-28 NOTE — PROGRESS NOTES
Nutrition Services    Patient Name:  Shahrzad Horton  YOB: 1975  MRN: [id number]  Admit Date:  (Not on file)    Patient was seen in surgery outpatient office on this date. Weight of 290 lbs with a weight gain of 6 lbs.  Patient unsure why she had gained weight. Patient stated she added a protein drink / shake at breakfast.  She is trying to eat at least 2 meals per day.  She started walking almost 1 mile at least every other day. She has decreased smoking down to 1/2 pack every 2-3 days. We reviewed meal and snack options.  I encouraged to follow a 1500 kcal diet / meal plan.  Patient stated she didn't remember getting meal plan with sample menus.  I documented last visit it was given to patient.  I will f/u with mail or more information at next visit.  I encouraged to stop smoking, increasing exercise, consistent meals, and portion control.  Will continue to follow patient monthly.       Electronically signed by:  Naomy Rick RD  06/28/24 10:25 EDT

## 2024-06-28 NOTE — PROGRESS NOTES
Nutrition Services    Patient Name:  Shahrzad Horton  YOB: 1975  MRN: [id number]  Admit Date:  5/24/24 - late entry    Nutrition counseling visit # 2:    Patient was seen in surgery outpatient office on this date. Weight of 290 lbs with a weight gain of 6 lbs.  Patient unsure why she had gained weight. Patient stated she added a protein drink / shake at breakfast.  She is trying to eat at least 2 meals per day.  She started walking almost 1 mile at least every other day. She has decreased smoking down to 1/2 pack every 2-3 days. We reviewed meal and snack options.  I encouraged to follow a 1500 kcal diet / meal plan.  Patient stated she didn't remember getting meal plan with sample menus.  I documented last visit it was given to patient.  I will f/u with mail or more information at next visit.  I encouraged to stop smoking, increasing exercise, consistent meals, and portion control.    Electronically signed by:  Naomy Rick RD  06/28/24 09:26 EDT

## 2024-07-12 ENCOUNTER — TELEPHONE (OUTPATIENT)
Dept: PSYCHIATRY | Facility: CLINIC | Age: 49
End: 2024-07-12
Payer: COMMERCIAL

## 2025-07-25 ENCOUNTER — TRANSCRIBE ORDERS (OUTPATIENT)
Dept: ADMINISTRATIVE | Facility: HOSPITAL | Age: 50
End: 2025-07-25
Payer: COMMERCIAL

## 2025-07-25 DIAGNOSIS — Z12.31 VISIT FOR SCREENING MAMMOGRAM: Primary | ICD-10-CM

## 2025-08-08 ENCOUNTER — HOSPITAL ENCOUNTER (OUTPATIENT)
Facility: HOSPITAL | Age: 50
Discharge: HOME | End: 2025-08-08
Payer: COMMERCIAL

## 2025-08-08 DIAGNOSIS — Z12.31 VISIT FOR SCREENING MAMMOGRAM: ICD-10-CM

## 2025-08-08 PROCEDURE — 77063 BREAST TOMOSYNTHESIS BI: CPT

## 2025-08-08 PROCEDURE — 77067 SCR MAMMO BI INCL CAD: CPT

## 2025-08-29 RX ORDER — CYCLOBENZAPRINE HCL 10 MG
10 TABLET ORAL 3 TIMES DAILY PRN
COMMUNITY

## 2025-08-29 RX ORDER — NYSTATIN 100000 [USP'U]/ML
500000 SUSPENSION ORAL 4 TIMES DAILY
COMMUNITY

## (undated) DEVICE — COR MINOR LITHOTOMY: Brand: MEDLINE INDUSTRIES, INC.

## (undated) DEVICE — PENCL E/S HNDSWCH PUSHBTN HOLSTR 10FT

## (undated) DEVICE — Device: Brand: DEFENDO AIR/WATER/SUCTION AND BIOPSY VALVE

## (undated) DEVICE — THE BITE BLOCK MAXI, LATEX FREE STRAP IS USED TO PROTECT THE ENDOSCOPE INSERTION TUBE FROM BEING BITTEN BY THE PATIENT.

## (undated) DEVICE — PAD SANI MAXI W/ADHS SNG WRP 11IN

## (undated) DEVICE — GOWN,PREVENTION PLUS,XLONG/XLARGE,STRL: Brand: MEDLINE

## (undated) DEVICE — FRCP BX RADJAW4 NDL 2.8 240CM LG OG BX40

## (undated) DEVICE — ENCORE® LATEX MICRO SIZE 8, STERILE LATEX POWDER-FREE SURGICAL GLOVE: Brand: ENCORE

## (undated) DEVICE — CURITY AMD ANTIMICROBIAL PACKING STRIPS: Brand: CURITY

## (undated) DEVICE — Device

## (undated) DEVICE — SPNG GZ WOVN 4X4IN 12PLY 10/BX STRL